# Patient Record
Sex: MALE | Race: WHITE | NOT HISPANIC OR LATINO | Employment: OTHER | URBAN - METROPOLITAN AREA
[De-identification: names, ages, dates, MRNs, and addresses within clinical notes are randomized per-mention and may not be internally consistent; named-entity substitution may affect disease eponyms.]

---

## 2023-02-25 LAB
ALANINE AMINOTRANSFERASE (SGPT) (U/L) IN SER/PLAS: 22 U/L (ref 10–52)
ALBUMIN (G/DL) IN SER/PLAS: 4.4 G/DL (ref 3.4–5)
ALKALINE PHOSPHATASE (U/L) IN SER/PLAS: 56 U/L (ref 33–120)
ANION GAP IN SER/PLAS: 16 MMOL/L (ref 10–20)
ASPARTATE AMINOTRANSFERASE (SGOT) (U/L) IN SER/PLAS: 23 U/L (ref 9–39)
BILIRUBIN TOTAL (MG/DL) IN SER/PLAS: 0.5 MG/DL (ref 0–1.2)
CALCIUM (MG/DL) IN SER/PLAS: 9.3 MG/DL (ref 8.6–10.6)
CARBON DIOXIDE, TOTAL (MMOL/L) IN SER/PLAS: 21 MMOL/L (ref 21–32)
CHLORIDE (MMOL/L) IN SER/PLAS: 106 MMOL/L (ref 98–107)
CREATININE (MG/DL) IN SER/PLAS: 0.84 MG/DL (ref 0.5–1.3)
ESTIMATED AVERAGE GLUCOSE FOR HBA1C: 157 MG/DL
GFR MALE: >90 ML/MIN/1.73M2
GLUCOSE (MG/DL) IN SER/PLAS: 193 MG/DL (ref 74–99)
HEMOGLOBIN A1C/HEMOGLOBIN TOTAL IN BLOOD: 7.1 %
POTASSIUM (MMOL/L) IN SER/PLAS: 4.7 MMOL/L (ref 3.5–5.3)
PROTEIN TOTAL: 6.4 G/DL (ref 6.4–8.2)
SODIUM (MMOL/L) IN SER/PLAS: 138 MMOL/L (ref 136–145)
UREA NITROGEN (MG/DL) IN SER/PLAS: 19 MG/DL (ref 6–23)

## 2023-03-15 PROBLEM — G47.34 NOCTURNAL HYPOXIA: Status: ACTIVE | Noted: 2023-03-15

## 2023-03-15 PROBLEM — I10 HTN (HYPERTENSION), BENIGN: Status: ACTIVE | Noted: 2023-03-15

## 2023-03-15 PROBLEM — I42.0 CARDIOMYOPATHY, DILATED (MULTI): Status: ACTIVE | Noted: 2023-03-15

## 2023-03-15 PROBLEM — M54.9 CHRONIC BACK PAIN: Status: ACTIVE | Noted: 2023-03-15

## 2023-03-15 PROBLEM — G47.33 OSA (OBSTRUCTIVE SLEEP APNEA): Status: ACTIVE | Noted: 2023-03-15

## 2023-03-15 PROBLEM — N40.0 ENLARGED PROSTATE: Status: ACTIVE | Noted: 2023-03-15

## 2023-03-15 PROBLEM — I26.99 BILATERAL PULMONARY EMBOLISM (MULTI): Status: ACTIVE | Noted: 2023-03-15

## 2023-03-15 PROBLEM — R93.1 REGIONAL WALL MOTION ABNORMALITY OF HEART: Status: ACTIVE | Noted: 2023-03-15

## 2023-03-15 PROBLEM — K21.9 CHRONIC GERD: Status: ACTIVE | Noted: 2023-03-15

## 2023-03-15 PROBLEM — M54.50 LOWER BACK PAIN: Status: ACTIVE | Noted: 2023-03-15

## 2023-03-15 PROBLEM — D75.829 HEPARIN-INDUCED THROMBOCYTOPENIA (MULTI): Status: ACTIVE | Noted: 2023-03-15

## 2023-03-15 PROBLEM — R29.898 LEG WEAKNESS: Status: ACTIVE | Noted: 2023-03-15

## 2023-03-15 PROBLEM — M54.16 LUMBAR RADICULAR PAIN: Status: ACTIVE | Noted: 2023-03-15

## 2023-03-15 PROBLEM — Z86.59 HISTORY OF DEPRESSION: Status: ACTIVE | Noted: 2023-03-15

## 2023-03-15 PROBLEM — E83.42 HYPOMAGNESEMIA: Status: ACTIVE | Noted: 2023-03-15

## 2023-03-15 PROBLEM — R79.89 LOW TESTOSTERONE: Status: ACTIVE | Noted: 2023-03-15

## 2023-03-15 PROBLEM — E11.40 DIABETIC NEUROPATHY (MULTI): Status: ACTIVE | Noted: 2023-03-15

## 2023-03-15 PROBLEM — M54.16 CHRONIC LUMBAR RADICULOPATHY: Status: ACTIVE | Noted: 2023-03-15

## 2023-03-15 PROBLEM — G89.29 CHRONIC BACK PAIN: Status: ACTIVE | Noted: 2023-03-15

## 2023-03-15 PROBLEM — M54.30 SCIATICA NEURALGIA: Status: ACTIVE | Noted: 2023-03-15

## 2023-03-15 PROBLEM — I82.409 DVT OF LOWER EXTREMITY (DEEP VENOUS THROMBOSIS) (MULTI): Status: ACTIVE | Noted: 2023-03-15

## 2023-03-15 PROBLEM — M25.552 LEFT HIP PAIN: Status: ACTIVE | Noted: 2023-03-15

## 2023-03-15 PROBLEM — N40.0 BPH (BENIGN PROSTATIC HYPERPLASIA): Status: ACTIVE | Noted: 2023-03-15

## 2023-03-15 PROBLEM — M51.9 LUMBAR DISC DISEASE: Status: ACTIVE | Noted: 2023-03-15

## 2023-03-15 PROBLEM — R30.0 DYSURIA: Status: ACTIVE | Noted: 2023-03-15

## 2023-03-15 PROBLEM — E66.9 OBESITY: Status: ACTIVE | Noted: 2023-03-15

## 2023-03-15 PROBLEM — M51.26 LUMBAR DISC HERNIATION: Status: ACTIVE | Noted: 2023-03-15

## 2023-03-15 PROBLEM — M47.816 LUMBAR SPONDYLOSIS: Status: ACTIVE | Noted: 2023-03-15

## 2023-03-15 PROBLEM — Z86.718 HISTORY OF VENOUS THROMBOEMBOLISM: Status: ACTIVE | Noted: 2023-03-15

## 2023-03-15 PROBLEM — E55.9 VITAMIN D DEFICIENCY: Status: ACTIVE | Noted: 2023-03-15

## 2023-03-15 PROBLEM — E78.00 HYPERCHOLESTEREMIA: Status: ACTIVE | Noted: 2023-03-15

## 2023-03-15 PROBLEM — M48.061 SPINAL STENOSIS, LUMBAR: Status: ACTIVE | Noted: 2023-03-15

## 2023-03-15 PROBLEM — G62.9 NEUROPATHY: Status: ACTIVE | Noted: 2023-03-15

## 2023-03-15 PROBLEM — R26.81 UNSTEADY GAIT: Status: ACTIVE | Noted: 2023-03-15

## 2023-03-15 PROBLEM — E78.1 HYPERTRIGLYCERIDEMIA: Status: ACTIVE | Noted: 2023-03-15

## 2023-03-15 PROBLEM — E11.9 DM TYPE 2 (DIABETES MELLITUS, TYPE 2) (MULTI): Status: ACTIVE | Noted: 2023-03-15

## 2023-03-15 RX ORDER — GABAPENTIN 300 MG/1
3 CAPSULE ORAL 2 TIMES DAILY
COMMUNITY
Start: 2019-07-18 | End: 2023-03-17 | Stop reason: SDUPTHER

## 2023-03-15 RX ORDER — INSULIN GLARGINE-YFGN 100 [IU]/ML
64 INJECTION, SOLUTION SUBCUTANEOUS DAILY
COMMUNITY
Start: 2021-10-04 | End: 2023-10-05 | Stop reason: SDUPTHER

## 2023-03-15 RX ORDER — PANTOPRAZOLE SODIUM 40 MG/1
1 TABLET, DELAYED RELEASE ORAL DAILY
COMMUNITY
Start: 2022-08-18 | End: 2023-03-17 | Stop reason: SDUPTHER

## 2023-03-15 RX ORDER — ROSUVASTATIN CALCIUM 20 MG/1
1 TABLET, COATED ORAL DAILY
COMMUNITY
Start: 2021-05-03 | End: 2023-03-17 | Stop reason: SDUPTHER

## 2023-03-15 RX ORDER — VENLAFAXINE HYDROCHLORIDE 150 MG/1
150 CAPSULE, EXTENDED RELEASE ORAL DAILY
COMMUNITY
Start: 2019-07-18 | End: 2023-03-17 | Stop reason: SDUPTHER

## 2023-03-15 RX ORDER — NAPROXEN 500 MG/1
500 TABLET ORAL 2 TIMES DAILY PRN
COMMUNITY
Start: 2021-05-06

## 2023-03-15 RX ORDER — TAMSULOSIN HYDROCHLORIDE 0.4 MG/1
0.8 CAPSULE ORAL DAILY
COMMUNITY
Start: 2019-07-18 | End: 2023-03-17 | Stop reason: SDUPTHER

## 2023-03-15 RX ORDER — CARVEDILOL 25 MG/1
2 TABLET ORAL 2 TIMES DAILY
COMMUNITY
Start: 2020-07-29 | End: 2024-01-22

## 2023-03-15 RX ORDER — METFORMIN HYDROCHLORIDE 1000 MG/1
1 TABLET ORAL 2 TIMES DAILY
COMMUNITY
Start: 2022-08-18 | End: 2024-02-05

## 2023-03-15 RX ORDER — INSULIN GLARGINE 100 [IU]/ML
44 INJECTION, SOLUTION SUBCUTANEOUS 2 TIMES DAILY
COMMUNITY
End: 2023-11-09 | Stop reason: SDUPTHER

## 2023-03-15 RX ORDER — SEMAGLUTIDE 2.68 MG/ML
2 INJECTION, SOLUTION SUBCUTANEOUS
COMMUNITY
Start: 2021-09-09 | End: 2023-10-05 | Stop reason: SDUPTHER

## 2023-03-15 RX ORDER — ACETAMINOPHEN 325 MG/1
1-2 TABLET ORAL EVERY 4 HOURS PRN
COMMUNITY

## 2023-03-15 RX ORDER — LISINOPRIL 20 MG/1
1 TABLET ORAL DAILY
COMMUNITY
Start: 2020-05-26 | End: 2024-01-25 | Stop reason: DRUGHIGH

## 2023-03-17 ENCOUNTER — OFFICE VISIT (OUTPATIENT)
Dept: PRIMARY CARE | Facility: CLINIC | Age: 53
End: 2023-03-17
Payer: COMMERCIAL

## 2023-03-17 VITALS
HEIGHT: 77 IN | HEART RATE: 98 BPM | WEIGHT: 315 LBS | TEMPERATURE: 95.4 F | OXYGEN SATURATION: 99 % | RESPIRATION RATE: 18 BRPM | DIASTOLIC BLOOD PRESSURE: 76 MMHG | BODY MASS INDEX: 37.19 KG/M2 | SYSTOLIC BLOOD PRESSURE: 114 MMHG

## 2023-03-17 DIAGNOSIS — M54.16 LUMBAR RADICULAR PAIN: ICD-10-CM

## 2023-03-17 DIAGNOSIS — F41.9 ANXIETY AND DEPRESSION: ICD-10-CM

## 2023-03-17 DIAGNOSIS — K21.9 CHRONIC GERD: ICD-10-CM

## 2023-03-17 DIAGNOSIS — N40.1 BENIGN PROSTATIC HYPERPLASIA WITH LOWER URINARY TRACT SYMPTOMS, SYMPTOM DETAILS UNSPECIFIED: ICD-10-CM

## 2023-03-17 DIAGNOSIS — F32.A ANXIETY AND DEPRESSION: ICD-10-CM

## 2023-03-17 DIAGNOSIS — E78.5 HYPERLIPIDEMIA, UNSPECIFIED HYPERLIPIDEMIA TYPE: ICD-10-CM

## 2023-03-17 DIAGNOSIS — M79.602 ARM PAIN, ANTERIOR, LEFT: Primary | ICD-10-CM

## 2023-03-17 PROCEDURE — 4010F ACE/ARB THERAPY RXD/TAKEN: CPT | Performed by: NURSE PRACTITIONER

## 2023-03-17 PROCEDURE — 1036F TOBACCO NON-USER: CPT | Performed by: NURSE PRACTITIONER

## 2023-03-17 PROCEDURE — 99214 OFFICE O/P EST MOD 30 MIN: CPT | Performed by: NURSE PRACTITIONER

## 2023-03-17 PROCEDURE — 3078F DIAST BP <80 MM HG: CPT | Performed by: NURSE PRACTITIONER

## 2023-03-17 PROCEDURE — 3074F SYST BP LT 130 MM HG: CPT | Performed by: NURSE PRACTITIONER

## 2023-03-17 PROCEDURE — 3051F HG A1C>EQUAL 7.0%<8.0%: CPT | Performed by: NURSE PRACTITIONER

## 2023-03-17 RX ORDER — FLASH GLUCOSE SCANNING READER
EACH MISCELLANEOUS
COMMUNITY
Start: 2022-06-09 | End: 2023-09-21 | Stop reason: ALTCHOICE

## 2023-03-17 RX ORDER — METHYLPREDNISOLONE 4 MG/1
TABLET ORAL
Qty: 21 TABLET | Refills: 0 | Status: SHIPPED | OUTPATIENT
Start: 2023-03-17 | End: 2023-03-17 | Stop reason: ENTERED-IN-ERROR

## 2023-03-17 RX ORDER — ROSUVASTATIN CALCIUM 20 MG/1
20 TABLET, COATED ORAL DAILY
Qty: 90 TABLET | Refills: 1 | Status: SHIPPED | OUTPATIENT
Start: 2023-03-17 | End: 2023-09-21 | Stop reason: SDUPTHER

## 2023-03-17 RX ORDER — TAMSULOSIN HYDROCHLORIDE 0.4 MG/1
0.8 CAPSULE ORAL DAILY
Qty: 90 CAPSULE | Refills: 1 | Status: SHIPPED | OUTPATIENT
Start: 2023-03-17 | End: 2023-09-21 | Stop reason: SDUPTHER

## 2023-03-17 RX ORDER — GABAPENTIN 300 MG/1
900 CAPSULE ORAL 2 TIMES DAILY
Qty: 90 CAPSULE | Refills: 1 | Status: SHIPPED | OUTPATIENT
Start: 2023-03-17 | End: 2023-05-01

## 2023-03-17 RX ORDER — VENLAFAXINE HYDROCHLORIDE 150 MG/1
150 CAPSULE, EXTENDED RELEASE ORAL DAILY
Qty: 90 CAPSULE | Refills: 1 | Status: SHIPPED | OUTPATIENT
Start: 2023-03-17 | End: 2023-09-21 | Stop reason: SDUPTHER

## 2023-03-17 RX ORDER — METHYLPREDNISOLONE 4 MG/1
TABLET ORAL
Qty: 21 TABLET | Refills: 0 | Status: SHIPPED | OUTPATIENT
Start: 2023-03-17 | End: 2023-03-24

## 2023-03-17 RX ORDER — FLASH GLUCOSE SENSOR
KIT MISCELLANEOUS
COMMUNITY
Start: 2022-08-31

## 2023-03-17 RX ORDER — PANTOPRAZOLE SODIUM 40 MG/1
40 TABLET, DELAYED RELEASE ORAL DAILY
Qty: 90 TABLET | Refills: 1 | Status: SHIPPED | OUTPATIENT
Start: 2023-03-17 | End: 2023-09-21 | Stop reason: SDUPTHER

## 2023-03-17 ASSESSMENT — ENCOUNTER SYMPTOMS
JOINT SWELLING: 0
NECK PAIN: 0
NUMBNESS: 1

## 2023-03-17 NOTE — PROGRESS NOTES
"Subjective   Patient ID: Hua Saavedra is a 52 y.o. male who presents for Extremity Weakness and Arm Pain.    HPI     Patient presents to clinic for follow-up visit and medication refills.    Patient states he been having sensation of numbness and weakness in his left arm for 2 weeks.  He states he has full range of motion of his arm.  He denies any trauma or injury to the arm.  He states he had similar symptoms a few years ago took steroids and it relieved the symptoms.    Other than his arm numbness he states generally he has been feeling okay.    Patient being followed by cardiology managing cardiomyopathy and hypertension.     Patient also being followed by endocrinology managing his diabetes mellitus last hemoglobin A1c  7.1 Feb 2023.     Appetite Normal Bowel and Bladder normal     Review of Systems   Cardiovascular:  Negative for chest pain.   Musculoskeletal:  Negative for joint swelling and neck pain.   Neurological:  Positive for numbness (Right arm).       Objective   /76   Pulse 98   Temp 35.2 °C (95.4 °F) (Temporal)   Resp 18   Ht 1.956 m (6' 5\")   Wt (!) 151 kg (333 lb)   SpO2 99%   BMI 39.49 kg/m²     Physical Exam  Vitals reviewed.   Constitutional:       Appearance: Normal appearance.   Cardiovascular:      Rate and Rhythm: Normal rate and regular rhythm.   Pulmonary:      Effort: Pulmonary effort is normal.      Breath sounds: Normal breath sounds.   Musculoskeletal:         General: No tenderness. Normal range of motion.      Cervical back: Normal range of motion and neck supple.   Skin:     General: Skin is warm and dry.   Neurological:      Mental Status: He is alert and oriented to person, place, and time.         Assessment/Plan   Problem List Items Addressed This Visit          Nervous    Lumbar radicular pain    Relevant Medications    gabapentin (Neurontin) 300 mg capsule    pantoprazole (ProtoNix) 40 mg EC tablet       Digestive    Chronic GERD    Relevant Medications    " pantoprazole (ProtoNix) 40 mg EC tablet       Genitourinary    BPH (benign prostatic hyperplasia)    Relevant Medications    tamsulosin (Flomax) 0.4 mg 24 hr capsule     Other Visit Diagnoses       Arm pain, anterior, left    -  Primary    Relevant Medications    methylPREDNISolone (Medrol Dospak) 4 mg tablets    Hyperlipidemia, unspecified hyperlipidemia type        Relevant Medications    rosuvastatin (Crestor) 20 mg tablet    Anxiety and depression        Relevant Medications    venlafaxine XR (Effexor-XR) 150 mg 24 hr capsule

## 2023-03-17 NOTE — PATIENT INSTRUCTIONS
Continue medications as prescribed.  Healthy diet and drink plenty of water.  Please schedule all necessary health screenings ophthalmology and dentist.    Call office with update.  Follow-up in 6 months.  Call office any new concerns.

## 2023-04-13 ENCOUNTER — TELEPHONE (OUTPATIENT)
Dept: PRIMARY CARE | Facility: CLINIC | Age: 53
End: 2023-04-13

## 2023-04-13 NOTE — TELEPHONE ENCOUNTER
Patient left a voicemail stating that on day 3 and 4 of prednisone he was back to normal then it wore off, so what does he do next? Neurologist? Ortho?

## 2023-04-14 DIAGNOSIS — R20.0 ARM NUMBNESS LEFT: Primary | ICD-10-CM

## 2023-04-28 DIAGNOSIS — M54.16 LUMBAR RADICULAR PAIN: ICD-10-CM

## 2023-05-01 RX ORDER — GABAPENTIN 300 MG/1
CAPSULE ORAL
Qty: 180 CAPSULE | Refills: 2 | Status: SHIPPED | OUTPATIENT
Start: 2023-05-01 | End: 2023-07-11

## 2023-07-10 DIAGNOSIS — M54.16 LUMBAR RADICULAR PAIN: ICD-10-CM

## 2023-07-11 RX ORDER — GABAPENTIN 300 MG/1
CAPSULE ORAL
Qty: 180 CAPSULE | Refills: 6 | Status: SHIPPED | OUTPATIENT
Start: 2023-07-11 | End: 2024-02-06

## 2023-09-21 ENCOUNTER — OFFICE VISIT (OUTPATIENT)
Dept: PRIMARY CARE | Facility: CLINIC | Age: 53
End: 2023-09-21
Payer: COMMERCIAL

## 2023-09-21 VITALS
DIASTOLIC BLOOD PRESSURE: 81 MMHG | HEART RATE: 96 BPM | BODY MASS INDEX: 38.43 KG/M2 | WEIGHT: 315 LBS | SYSTOLIC BLOOD PRESSURE: 118 MMHG

## 2023-09-21 DIAGNOSIS — M54.16 LUMBAR RADICULAR PAIN: Primary | ICD-10-CM

## 2023-09-21 DIAGNOSIS — E78.5 HYPERLIPIDEMIA, UNSPECIFIED HYPERLIPIDEMIA TYPE: ICD-10-CM

## 2023-09-21 DIAGNOSIS — K21.9 CHRONIC GERD: ICD-10-CM

## 2023-09-21 DIAGNOSIS — N40.1 BENIGN PROSTATIC HYPERPLASIA WITH LOWER URINARY TRACT SYMPTOMS, SYMPTOM DETAILS UNSPECIFIED: ICD-10-CM

## 2023-09-21 DIAGNOSIS — F41.9 ANXIETY AND DEPRESSION: ICD-10-CM

## 2023-09-21 DIAGNOSIS — F32.A ANXIETY AND DEPRESSION: ICD-10-CM

## 2023-09-21 PROCEDURE — 3074F SYST BP LT 130 MM HG: CPT | Performed by: NURSE PRACTITIONER

## 2023-09-21 PROCEDURE — 3051F HG A1C>EQUAL 7.0%<8.0%: CPT | Performed by: NURSE PRACTITIONER

## 2023-09-21 PROCEDURE — 3079F DIAST BP 80-89 MM HG: CPT | Performed by: NURSE PRACTITIONER

## 2023-09-21 PROCEDURE — 90471 IMMUNIZATION ADMIN: CPT | Performed by: NURSE PRACTITIONER

## 2023-09-21 PROCEDURE — 90682 RIV4 VACC RECOMBINANT DNA IM: CPT | Performed by: NURSE PRACTITIONER

## 2023-09-21 PROCEDURE — 1036F TOBACCO NON-USER: CPT | Performed by: NURSE PRACTITIONER

## 2023-09-21 PROCEDURE — 4010F ACE/ARB THERAPY RXD/TAKEN: CPT | Performed by: NURSE PRACTITIONER

## 2023-09-21 PROCEDURE — 99214 OFFICE O/P EST MOD 30 MIN: CPT | Performed by: NURSE PRACTITIONER

## 2023-09-21 RX ORDER — PANTOPRAZOLE SODIUM 40 MG/1
40 TABLET, DELAYED RELEASE ORAL DAILY
Qty: 90 TABLET | Refills: 2 | Status: SHIPPED | OUTPATIENT
Start: 2023-09-21

## 2023-09-21 RX ORDER — TAMSULOSIN HYDROCHLORIDE 0.4 MG/1
0.8 CAPSULE ORAL DAILY
Qty: 90 CAPSULE | Refills: 2 | Status: SHIPPED | OUTPATIENT
Start: 2023-09-21 | End: 2024-01-22

## 2023-09-21 RX ORDER — VENLAFAXINE HYDROCHLORIDE 150 MG/1
150 CAPSULE, EXTENDED RELEASE ORAL DAILY
Qty: 90 CAPSULE | Refills: 2 | Status: SHIPPED | OUTPATIENT
Start: 2023-09-21 | End: 2024-05-09 | Stop reason: SDUPTHER

## 2023-09-21 RX ORDER — ROSUVASTATIN CALCIUM 20 MG/1
20 TABLET, COATED ORAL DAILY
Qty: 90 TABLET | Refills: 2 | Status: SHIPPED | OUTPATIENT
Start: 2023-09-21

## 2023-09-21 NOTE — PROGRESS NOTES
Subjective   Patient ID: Hua Saavedra is a 53 y.o. male who presents for Follow-up.    HPI   Patient presents to clinic for follow-up visit need medication refills.    Being followed by neurology for cervical radiculitis with PT and OT states symptoms has much improved.  He states he is also receiving home physical therapy.    Patient also being followed by endocrinology managing his diabetes.    Appetite normal bowel and bladder normal.      Review of Systems   All other systems reviewed and are negative.      Objective   /81   Pulse 69   Wt 147 kg (324 lb 1.2 oz)   BMI 38.43 kg/m²     Physical Exam  Vitals reviewed.   Constitutional:       Appearance: Normal appearance. He is not ill-appearing or toxic-appearing.   Cardiovascular:      Rate and Rhythm: Normal rate and regular rhythm.   Pulmonary:      Effort: Pulmonary effort is normal.      Breath sounds: Normal breath sounds.   Musculoskeletal:         General: Normal range of motion.   Skin:     General: Skin is warm and dry.   Neurological:      Mental Status: He is alert and oriented to person, place, and time.         Assessment/Plan   Problem List Items Addressed This Visit       BPH (benign prostatic hyperplasia)    Relevant Medications    tamsulosin (Flomax) 0.4 mg 24 hr capsule    Other Relevant Orders    Prostate Spec.Ag,Screen    Chronic GERD    Relevant Medications    pantoprazole (ProtoNix) 40 mg EC tablet    Lumbar radicular pain - Primary    Relevant Medications    pantoprazole (ProtoNix) 40 mg EC tablet     Other Visit Diagnoses       Anxiety and depression        Relevant Medications    venlafaxine XR (Effexor-XR) 150 mg 24 hr capsule    Other Relevant Orders    Tsh With Reflex To Free T4 If Abnormal    Hyperlipidemia, unspecified hyperlipidemia type        Relevant Medications    rosuvastatin (Crestor) 20 mg tablet

## 2023-09-21 NOTE — PATIENT INSTRUCTIONS
Continue medications as prescribed.  Healthy diet and drink plenty of water.  Please schedule all necessary health screenings ophthalmology and dentist.    Follow-up in 6 MONTHS.  Call office any new concerns.

## 2023-09-28 ASSESSMENT — ENCOUNTER SYMPTOMS
LEG PAIN: 1
WEAKNESS: 1
NUMBNESS: 1
BACK PAIN: 1

## 2023-09-29 ENCOUNTER — OFFICE VISIT (OUTPATIENT)
Dept: PRIMARY CARE | Facility: CLINIC | Age: 53
End: 2023-09-29
Payer: COMMERCIAL

## 2023-09-29 VITALS
WEIGHT: 315 LBS | TEMPERATURE: 98 F | OXYGEN SATURATION: 96 % | BODY MASS INDEX: 38.57 KG/M2 | SYSTOLIC BLOOD PRESSURE: 130 MMHG | DIASTOLIC BLOOD PRESSURE: 76 MMHG | RESPIRATION RATE: 18 BRPM | HEART RATE: 81 BPM

## 2023-09-29 DIAGNOSIS — M54.41 ACUTE RIGHT-SIDED LOW BACK PAIN WITH RIGHT-SIDED SCIATICA: Primary | ICD-10-CM

## 2023-09-29 PROCEDURE — 4010F ACE/ARB THERAPY RXD/TAKEN: CPT | Performed by: NURSE PRACTITIONER

## 2023-09-29 PROCEDURE — 3075F SYST BP GE 130 - 139MM HG: CPT | Performed by: NURSE PRACTITIONER

## 2023-09-29 PROCEDURE — 3051F HG A1C>EQUAL 7.0%<8.0%: CPT | Performed by: NURSE PRACTITIONER

## 2023-09-29 PROCEDURE — 3078F DIAST BP <80 MM HG: CPT | Performed by: NURSE PRACTITIONER

## 2023-09-29 PROCEDURE — 99214 OFFICE O/P EST MOD 30 MIN: CPT | Performed by: NURSE PRACTITIONER

## 2023-09-29 PROCEDURE — 1036F TOBACCO NON-USER: CPT | Performed by: NURSE PRACTITIONER

## 2023-09-29 RX ORDER — METHOCARBAMOL 500 MG/1
TABLET, FILM COATED ORAL
Qty: 20 TABLET | Refills: 0 | Status: SHIPPED | OUTPATIENT
Start: 2023-09-29 | End: 2023-10-05 | Stop reason: ALTCHOICE

## 2023-09-29 RX ORDER — METHYLPREDNISOLONE 4 MG/1
TABLET ORAL
Qty: 21 TABLET | Refills: 0 | Status: SHIPPED | OUTPATIENT
Start: 2023-09-29 | End: 2023-10-05 | Stop reason: ALTCHOICE

## 2023-09-29 ASSESSMENT — ENCOUNTER SYMPTOMS
DYSURIA: 0
NUMBNESS: 1
LEG PAIN: 1
WEAKNESS: 1
BACK PAIN: 1
FLANK PAIN: 0

## 2023-09-29 ASSESSMENT — PATIENT HEALTH QUESTIONNAIRE - PHQ9
SUM OF ALL RESPONSES TO PHQ9 QUESTIONS 1 AND 2: 0
2. FEELING DOWN, DEPRESSED OR HOPELESS: NOT AT ALL
1. LITTLE INTEREST OR PLEASURE IN DOING THINGS: NOT AT ALL

## 2023-09-29 ASSESSMENT — PAIN SCALES - GENERAL: PAINLEVEL: 8

## 2023-09-29 NOTE — PATIENT INSTRUCTIONS
You have been diagnosed with low back pain with sciatica.  Please take steroids as prescribed.  The steroids will temporarily increase your blood sugars please check regularly.  Please take muscle relaxer as needed for pain at night.  Continue ice or heat packs as needed.  Continue lidocaine patch as needed.  Follow-up as needed.  We will refer to medical spine clinic if needed.

## 2023-09-29 NOTE — PROGRESS NOTES
Answers submitted by the patient for this visit:  Back Pain Questionnaire (Submitted on 9/28/2023)  Chief Complaint: Back pain  Chronicity: new  Onset: in the past 7 days  Frequency: constantly  Progression since onset: unchanged  Pain location: gluteal  Pain quality: shooting  Radiates to: right thigh  Pain - numeric: 8/10  Pain is: the same all the time  Aggravated by: position  Stiffness is present: in the morning, at night, all day  leg pain: Yes  numbness: Yes  weakness: Yes  Risk factors: lack of exercise, obesity

## 2023-09-29 NOTE — PROGRESS NOTES
Subjective   Patient ID: Hua Saavedra is a 53 y.o. male who presents for Lower back pain (Right leg/thigh/buttock pain. Reports ongoing since Sunday morning. ).    Back Pain  This is a new problem. The current episode started in the past 7 days. The problem occurs constantly. The problem is unchanged. The pain is present in the gluteal. The quality of the pain is described as shooting. The pain radiates to the right thigh. The pain is at a severity of 8/10. The pain is The same all the time. The symptoms are aggravated by position. Stiffness is present In the morning, at night and all day. Associated symptoms include leg pain, numbness and weakness. Pertinent negatives include no dysuria. Risk factors include lack of exercise and obesity.        Patient complains of pain x5 days in the lower back that radiates down right buttock and leg.  He denies any direct trauma or injury but states he did a lot of walking going to a baseball game and did not have his walker.  She denies urinary symptoms.    Patient with history of chronic back problems history of laminectomy.  Patient states he has been using over-the-counter medication lidocaine patches and Advil.  He also states he has been using ice and heat without relief of pain.       Review of Systems   Genitourinary:  Negative for dysuria and flank pain.   Musculoskeletal:  Positive for back pain and gait problem.   Neurological:  Positive for weakness and numbness.       Objective   /76 (BP Location: Left arm, Patient Position: Sitting, BP Cuff Size: Large adult)   Pulse 81   Temp 36.7 °C (98 °F) (Temporal)   Resp 18   Wt 148 kg (325 lb 4.8 oz)   SpO2 96%   BMI 38.57 kg/m²     Physical Exam  Vitals reviewed.   Constitutional:       Appearance: Normal appearance.   Cardiovascular:      Rate and Rhythm: Normal rate and regular rhythm.   Pulmonary:      Effort: Pulmonary effort is normal.      Breath sounds: Normal breath sounds.   Musculoskeletal:          General: Normal range of motion.      Comments: Mild point tenderness near right sciatic nerve negative tenderness spinal or paraspinal       Skin:     General: Skin is warm and dry.   Neurological:      Mental Status: He is alert and oriented to person, place, and time.         Assessment/Plan   Problem List Items Addressed This Visit       Lower back pain - Primary    Relevant Medications    methylPREDNISolone (Medrol Dospak) 4 mg tablets    methocarbamol (Robaxin) 500 mg tablet  Lidocaine patches as needed  Ice or heat packs as needed.  Medical spine referral if needed.  Physical therapy referral if needed.

## 2023-09-30 ENCOUNTER — LAB (OUTPATIENT)
Dept: LAB | Facility: LAB | Age: 53
End: 2023-09-30
Payer: COMMERCIAL

## 2023-09-30 DIAGNOSIS — E11.9 TYPE 2 DIABETES MELLITUS WITHOUT COMPLICATIONS (MULTI): Primary | ICD-10-CM

## 2023-09-30 DIAGNOSIS — F32.A ANXIETY AND DEPRESSION: ICD-10-CM

## 2023-09-30 DIAGNOSIS — F41.9 ANXIETY AND DEPRESSION: ICD-10-CM

## 2023-09-30 DIAGNOSIS — N40.1 BENIGN PROSTATIC HYPERPLASIA WITH LOWER URINARY TRACT SYMPTOMS, SYMPTOM DETAILS UNSPECIFIED: ICD-10-CM

## 2023-09-30 LAB
ALBUMIN SERPL BCP-MCNC: 4.2 G/DL (ref 3.4–5)
ALP SERPL-CCNC: 45 U/L (ref 33–120)
ALT SERPL W P-5'-P-CCNC: 19 U/L (ref 10–52)
ANION GAP SERPL CALC-SCNC: 16 MMOL/L (ref 10–20)
AST SERPL W P-5'-P-CCNC: 18 U/L (ref 9–39)
BILIRUB SERPL-MCNC: 0.5 MG/DL (ref 0–1.2)
BUN SERPL-MCNC: 20 MG/DL (ref 6–23)
CALCIUM SERPL-MCNC: 9.2 MG/DL (ref 8.6–10.6)
CHLORIDE SERPL-SCNC: 102 MMOL/L (ref 98–107)
CHOLEST SERPL-MCNC: 136 MG/DL (ref 0–199)
CHOLESTEROL/HDL RATIO: 2.9
CO2 SERPL-SCNC: 24 MMOL/L (ref 21–32)
CREAT SERPL-MCNC: 0.79 MG/DL (ref 0.5–1.3)
CREAT UR-MCNC: 135.4 MG/DL (ref 20–370)
ERYTHROCYTE [DISTWIDTH] IN BLOOD BY AUTOMATED COUNT: 12.2 % (ref 11.5–14.5)
GFR SERPL CREATININE-BSD FRML MDRD: >90 ML/MIN/1.73M*2
GLUCOSE SERPL-MCNC: 126 MG/DL (ref 74–99)
HCT VFR BLD AUTO: 41.3 % (ref 41–52)
HDLC SERPL-MCNC: 47.6 MG/DL
HGB BLD-MCNC: 13.8 G/DL (ref 13.5–17.5)
LDLC SERPL CALC-MCNC: 60 MG/DL (ref 140–190)
MCH RBC QN AUTO: 31.2 PG (ref 26–34)
MCHC RBC AUTO-ENTMCNC: 33.4 G/DL (ref 32–36)
MCV RBC AUTO: 93 FL (ref 80–100)
MICROALBUMIN UR-MCNC: <7 MG/L
MICROALBUMIN/CREAT UR: NORMAL MG/G{CREAT}
NON HDL CHOLESTEROL: 88 MG/DL (ref 0–149)
NRBC BLD-RTO: 0 /100 WBCS (ref 0–0)
PLATELET # BLD AUTO: 163 X10*3/UL (ref 150–450)
PMV BLD AUTO: 11.1 FL (ref 7.5–11.5)
POTASSIUM SERPL-SCNC: 4.7 MMOL/L (ref 3.5–5.3)
PROT SERPL-MCNC: 6.4 G/DL (ref 6.4–8.2)
PSA SERPL-MCNC: 0.59 NG/ML
RBC # BLD AUTO: 4.42 X10*6/UL (ref 4.5–5.9)
SODIUM SERPL-SCNC: 137 MMOL/L (ref 136–145)
TRIGL SERPL-MCNC: 144 MG/DL (ref 0–149)
TSH SERPL-ACNC: 0.95 MIU/L (ref 0.44–3.98)
VLDL: 29 MG/DL (ref 0–40)
WBC # BLD AUTO: 8.9 X10*3/UL (ref 4.4–11.3)

## 2023-09-30 PROCEDURE — 36415 COLL VENOUS BLD VENIPUNCTURE: CPT

## 2023-10-01 LAB
EST. AVERAGE GLUCOSE BLD GHB EST-MCNC: 143 MG/DL
HBA1C MFR BLD: 6.6 %

## 2023-10-05 ENCOUNTER — OFFICE VISIT (OUTPATIENT)
Dept: ENDOCRINOLOGY | Facility: CLINIC | Age: 53
End: 2023-10-05
Payer: COMMERCIAL

## 2023-10-05 VITALS
SYSTOLIC BLOOD PRESSURE: 120 MMHG | HEART RATE: 82 BPM | WEIGHT: 315 LBS | HEIGHT: 77 IN | DIASTOLIC BLOOD PRESSURE: 76 MMHG | BODY MASS INDEX: 37.19 KG/M2

## 2023-10-05 DIAGNOSIS — I10 HTN (HYPERTENSION), BENIGN: ICD-10-CM

## 2023-10-05 DIAGNOSIS — E11.9 TYPE 2 DIABETES MELLITUS WITHOUT COMPLICATION, WITH LONG-TERM CURRENT USE OF INSULIN (MULTI): Primary | ICD-10-CM

## 2023-10-05 DIAGNOSIS — Z79.4 TYPE 2 DIABETES MELLITUS WITHOUT COMPLICATION, WITH LONG-TERM CURRENT USE OF INSULIN (MULTI): Primary | ICD-10-CM

## 2023-10-05 DIAGNOSIS — E78.00 HYPERCHOLESTEREMIA: ICD-10-CM

## 2023-10-05 PROCEDURE — 1036F TOBACCO NON-USER: CPT | Performed by: INTERNAL MEDICINE

## 2023-10-05 PROCEDURE — 3078F DIAST BP <80 MM HG: CPT | Performed by: INTERNAL MEDICINE

## 2023-10-05 PROCEDURE — 3062F POS MACROALBUMINURIA REV: CPT | Performed by: INTERNAL MEDICINE

## 2023-10-05 PROCEDURE — 99214 OFFICE O/P EST MOD 30 MIN: CPT | Performed by: INTERNAL MEDICINE

## 2023-10-05 PROCEDURE — 3044F HG A1C LEVEL LT 7.0%: CPT | Performed by: INTERNAL MEDICINE

## 2023-10-05 PROCEDURE — 4010F ACE/ARB THERAPY RXD/TAKEN: CPT | Performed by: INTERNAL MEDICINE

## 2023-10-05 PROCEDURE — 3048F LDL-C <100 MG/DL: CPT | Performed by: INTERNAL MEDICINE

## 2023-10-05 PROCEDURE — 3074F SYST BP LT 130 MM HG: CPT | Performed by: INTERNAL MEDICINE

## 2023-10-05 RX ORDER — SEMAGLUTIDE 2.68 MG/ML
2 INJECTION, SOLUTION SUBCUTANEOUS
Qty: 9 ML | Refills: 3 | Status: SHIPPED | OUTPATIENT
Start: 2023-10-05 | End: 2024-02-25

## 2023-10-05 ASSESSMENT — ENCOUNTER SYMPTOMS
VOMITING: 0
FEVER: 0
DIARRHEA: 0
SHORTNESS OF BREATH: 0
CHILLS: 0
NAUSEA: 0
LIGHT-HEADEDNESS: 0
DIZZINESS: 0

## 2023-10-05 NOTE — PROGRESS NOTES
"Subjective   Patient ID: Hua Saavedra is a 53 y.o. male who presents for Diabetes, Hypertension, and Hyperlipidemia.  HPI  Since last visit really working on eating.   Down 10 lbs.   More active until recent sciatica issue.   Sugars much improved.  Last time split long acting insulin which is going well.  No lows.       Review of Systems   Constitutional:  Negative for chills and fever.   Respiratory:  Negative for shortness of breath.    Gastrointestinal:  Negative for diarrhea, nausea and vomiting.   Endocrine: Negative for cold intolerance and heat intolerance.   Neurological:  Negative for dizziness and light-headedness.       Objective    10/05/23 08:22   Heart Rate 82   /76   Weight 145 kg (320 lb)   Height 1.956 m (6' 5\")   BMI (Calculated) 37.94     Physical Exam  Constitutional:       Appearance: Normal appearance. He is overweight.   HENT:      Head: Normocephalic and atraumatic.   Neck:      Thyroid: No thyroid mass, thyromegaly or thyroid tenderness.   Cardiovascular:      Rate and Rhythm: Normal rate and regular rhythm.      Heart sounds: No murmur heard.     No gallop.   Pulmonary:      Effort: Pulmonary effort is normal.      Breath sounds: Normal breath sounds.   Abdominal:      Palpations: Abdomen is soft.      Comments: benign   Neurological:      General: No focal deficit present.      Mental Status: He is alert and oriented to person, place, and time.      Deep Tendon Reflexes: Reflexes are normal and symmetric.   Psychiatric:         Behavior: Behavior is cooperative.         Assessment/Plan   Problem List Items Addressed This Visit             ICD-10-CM    DM type 2 (diabetes mellitus, type 2) (CMS/[facility]) - Primary E11.9    HTN (hypertension), benign I10    Hypercholesteremia E78.00   Dm2:  A1c excellent, continue current meds  Htn  BP excellent, continue current meds  Hyperlipidemia:  Controlled, continue statin  Follow up in 4 months       "

## 2023-10-18 ENCOUNTER — TELEPHONE (OUTPATIENT)
Dept: PRIMARY CARE | Facility: CLINIC | Age: 53
End: 2023-10-18
Payer: COMMERCIAL

## 2023-10-18 DIAGNOSIS — G89.29 CHRONIC RIGHT-SIDED LOW BACK PAIN WITH RIGHT-SIDED SCIATICA: Primary | ICD-10-CM

## 2023-10-18 DIAGNOSIS — M54.41 CHRONIC RIGHT-SIDED LOW BACK PAIN WITH RIGHT-SIDED SCIATICA: Primary | ICD-10-CM

## 2023-10-18 NOTE — TELEPHONE ENCOUNTER
PATIENT CALLED AND SAID THAT HIS LOWER BACK IS NOT GETTING BETTER AND WANTED TO KNOW IF HE NEEDED TO SEE HIS SURGEON AGAIN OR IF YOU COULD PUT THAT REFERRAL IN FOR MEDICAL SPINE CLINIC THAT YOU GUYS TALKED ABOUT DURING LAST VISIT

## 2023-11-08 ENCOUNTER — OFFICE VISIT (OUTPATIENT)
Dept: ORTHOPEDIC SURGERY | Facility: CLINIC | Age: 53
End: 2023-11-08
Payer: COMMERCIAL

## 2023-11-08 DIAGNOSIS — M54.41 CHRONIC RIGHT-SIDED LOW BACK PAIN WITH RIGHT-SIDED SCIATICA: ICD-10-CM

## 2023-11-08 DIAGNOSIS — G89.29 CHRONIC RIGHT-SIDED LOW BACK PAIN WITH RIGHT-SIDED SCIATICA: ICD-10-CM

## 2023-11-08 PROCEDURE — 3074F SYST BP LT 130 MM HG: CPT | Performed by: ORTHOPAEDIC SURGERY

## 2023-11-08 PROCEDURE — 99213 OFFICE O/P EST LOW 20 MIN: CPT | Performed by: ORTHOPAEDIC SURGERY

## 2023-11-08 PROCEDURE — 3062F POS MACROALBUMINURIA REV: CPT | Performed by: ORTHOPAEDIC SURGERY

## 2023-11-08 PROCEDURE — 3048F LDL-C <100 MG/DL: CPT | Performed by: ORTHOPAEDIC SURGERY

## 2023-11-08 PROCEDURE — 3044F HG A1C LEVEL LT 7.0%: CPT | Performed by: ORTHOPAEDIC SURGERY

## 2023-11-08 PROCEDURE — 4010F ACE/ARB THERAPY RXD/TAKEN: CPT | Performed by: ORTHOPAEDIC SURGERY

## 2023-11-08 PROCEDURE — 3078F DIAST BP <80 MM HG: CPT | Performed by: ORTHOPAEDIC SURGERY

## 2023-11-08 PROCEDURE — 1036F TOBACCO NON-USER: CPT | Performed by: ORTHOPAEDIC SURGERY

## 2023-11-08 NOTE — PROGRESS NOTES
Patient returns for follow-up.  He is 3 years status post lumbar laminectomy and excision of synovial cyst.    He did well following surgery.  He now presents with progressively worsening right leg radicular pain and claudication.  He has gone through multiple sessions of physical therapy over the last several months.  He is taking medications without relief.    He does not any focal deficits on exam.    He does not have any imaging for review today.    I recommended we check a new MRI of his lumbar spine.  He will follow-up with me after the MRI is complete.    *This note was dictated using speech recognition software and was not corrected for spelling or grammatical errors*

## 2023-11-09 DIAGNOSIS — Z79.4 TYPE 2 DIABETES MELLITUS WITHOUT COMPLICATION, WITH LONG-TERM CURRENT USE OF INSULIN (MULTI): Primary | ICD-10-CM

## 2023-11-09 DIAGNOSIS — E11.9 TYPE 2 DIABETES MELLITUS WITHOUT COMPLICATION, WITH LONG-TERM CURRENT USE OF INSULIN (MULTI): Primary | ICD-10-CM

## 2023-11-09 RX ORDER — INSULIN GLARGINE 100 [IU]/ML
44 INJECTION, SOLUTION SUBCUTANEOUS 2 TIMES DAILY
Qty: 79.2 ML | Refills: 3 | Status: SHIPPED | OUTPATIENT
Start: 2023-11-09 | End: 2024-11-08

## 2023-11-22 ENCOUNTER — ANCILLARY PROCEDURE (OUTPATIENT)
Dept: RADIOLOGY | Facility: CLINIC | Age: 53
End: 2023-11-22
Payer: COMMERCIAL

## 2023-11-22 DIAGNOSIS — G89.29 CHRONIC RIGHT-SIDED LOW BACK PAIN WITH RIGHT-SIDED SCIATICA: ICD-10-CM

## 2023-11-22 DIAGNOSIS — M54.41 CHRONIC RIGHT-SIDED LOW BACK PAIN WITH RIGHT-SIDED SCIATICA: ICD-10-CM

## 2023-11-22 PROCEDURE — 72148 MRI LUMBAR SPINE W/O DYE: CPT | Performed by: STUDENT IN AN ORGANIZED HEALTH CARE EDUCATION/TRAINING PROGRAM

## 2023-11-22 PROCEDURE — 72148 MRI LUMBAR SPINE W/O DYE: CPT

## 2023-12-13 ENCOUNTER — OFFICE VISIT (OUTPATIENT)
Dept: ORTHOPEDIC SURGERY | Facility: CLINIC | Age: 53
End: 2023-12-13
Payer: COMMERCIAL

## 2023-12-13 VITALS — HEIGHT: 77 IN | BODY MASS INDEX: 37.19 KG/M2 | WEIGHT: 315 LBS

## 2023-12-13 DIAGNOSIS — M48.062 NEUROGENIC CLAUDICATION DUE TO LUMBAR SPINAL STENOSIS: ICD-10-CM

## 2023-12-13 DIAGNOSIS — M54.16 LUMBAR RADICULOPATHY: Primary | ICD-10-CM

## 2023-12-13 PROCEDURE — 99213 OFFICE O/P EST LOW 20 MIN: CPT | Performed by: ORTHOPAEDIC SURGERY

## 2023-12-13 PROCEDURE — 3048F LDL-C <100 MG/DL: CPT | Performed by: ORTHOPAEDIC SURGERY

## 2023-12-13 PROCEDURE — 3044F HG A1C LEVEL LT 7.0%: CPT | Performed by: ORTHOPAEDIC SURGERY

## 2023-12-13 PROCEDURE — 1036F TOBACCO NON-USER: CPT | Performed by: ORTHOPAEDIC SURGERY

## 2023-12-13 PROCEDURE — 4010F ACE/ARB THERAPY RXD/TAKEN: CPT | Performed by: ORTHOPAEDIC SURGERY

## 2023-12-13 PROCEDURE — 3062F POS MACROALBUMINURIA REV: CPT | Performed by: ORTHOPAEDIC SURGERY

## 2023-12-13 ASSESSMENT — PAIN - FUNCTIONAL ASSESSMENT: PAIN_FUNCTIONAL_ASSESSMENT: NO/DENIES PAIN

## 2023-12-13 NOTE — PROGRESS NOTES
Patient returns for follow-up to review his MRI.  The pain that he was having at his last visit has subsided substantially, no greater than 4/10.  He thinks that it does not require any surgical treatment currently.    I personally reviewed the MRI.  This shows improved central stenosis at L2-3 with grade 1 spondylolisthesis now present.  At L3-4 there is severe central and lateral recess stenosis.  Severe lateral recess and moderate central stenosis at L4-5 with grade 1 spondylolisthesis.  There is underlying congenital lumbar stenosis as well.    At this point he would like to continue with conservative measures.  He asked for a new prescription for physical therapy which was provided.  I also recommended referral to pain management for trial of injections.    If he does not have relief of his symptoms and would like to proceed with surgery he would be a reasonable candidate, he should follow-up with me to discuss that at greater length if it comes to it.    *This note was dictated using speech recognition software and was not corrected for spelling or grammatical errors*

## 2023-12-27 ENCOUNTER — OFFICE VISIT (OUTPATIENT)
Dept: PAIN MEDICINE | Facility: CLINIC | Age: 53
End: 2023-12-27
Payer: COMMERCIAL

## 2023-12-27 VITALS
DIASTOLIC BLOOD PRESSURE: 83 MMHG | RESPIRATION RATE: 18 BRPM | SYSTOLIC BLOOD PRESSURE: 172 MMHG | BODY MASS INDEX: 36.84 KG/M2 | HEART RATE: 95 BPM | HEIGHT: 77 IN | TEMPERATURE: 96.4 F | WEIGHT: 312 LBS

## 2023-12-27 DIAGNOSIS — M48.062 NEUROGENIC CLAUDICATION DUE TO LUMBAR SPINAL STENOSIS: ICD-10-CM

## 2023-12-27 DIAGNOSIS — M54.16 LUMBAR RADICULOPATHY: ICD-10-CM

## 2023-12-27 PROCEDURE — 3079F DIAST BP 80-89 MM HG: CPT | Performed by: ANESTHESIOLOGY

## 2023-12-27 PROCEDURE — 99204 OFFICE O/P NEW MOD 45 MIN: CPT | Performed by: ANESTHESIOLOGY

## 2023-12-27 PROCEDURE — 3044F HG A1C LEVEL LT 7.0%: CPT | Performed by: ANESTHESIOLOGY

## 2023-12-27 PROCEDURE — 3048F LDL-C <100 MG/DL: CPT | Performed by: ANESTHESIOLOGY

## 2023-12-27 PROCEDURE — 4010F ACE/ARB THERAPY RXD/TAKEN: CPT | Performed by: ANESTHESIOLOGY

## 2023-12-27 PROCEDURE — 99214 OFFICE O/P EST MOD 30 MIN: CPT | Performed by: ANESTHESIOLOGY

## 2023-12-27 PROCEDURE — 3062F POS MACROALBUMINURIA REV: CPT | Performed by: ANESTHESIOLOGY

## 2023-12-27 PROCEDURE — 3077F SYST BP >= 140 MM HG: CPT | Performed by: ANESTHESIOLOGY

## 2023-12-27 PROCEDURE — 1036F TOBACCO NON-USER: CPT | Performed by: ANESTHESIOLOGY

## 2023-12-27 ASSESSMENT — PAIN DESCRIPTION - DESCRIPTORS: DESCRIPTORS: THROBBING;SHOOTING

## 2023-12-27 ASSESSMENT — PAIN SCALES - GENERAL
PAINLEVEL: 6
PAINLEVEL_OUTOF10: 6

## 2023-12-27 ASSESSMENT — ENCOUNTER SYMPTOMS
BACK PAIN: 1
ADENOPATHY: 0
DEPRESSION: 0
FEVER: 0
CHEST TIGHTNESS: 0
ABDOMINAL PAIN: 0
EYE PAIN: 0
WEAKNESS: 0
LOSS OF SENSATION IN FEET: 1
OCCASIONAL FEELINGS OF UNSTEADINESS: 1
SHORTNESS OF BREATH: 0

## 2023-12-27 ASSESSMENT — COLUMBIA-SUICIDE SEVERITY RATING SCALE - C-SSRS
2. HAVE YOU ACTUALLY HAD ANY THOUGHTS OF KILLING YOURSELF?: NO
1. IN THE PAST MONTH, HAVE YOU WISHED YOU WERE DEAD OR WISHED YOU COULD GO TO SLEEP AND NOT WAKE UP?: NO
6. HAVE YOU EVER DONE ANYTHING, STARTED TO DO ANYTHING, OR PREPARED TO DO ANYTHING TO END YOUR LIFE?: NO

## 2023-12-27 ASSESSMENT — PAIN - FUNCTIONAL ASSESSMENT: PAIN_FUNCTIONAL_ASSESSMENT: 0-10

## 2023-12-27 ASSESSMENT — LIFESTYLE VARIABLES: TOTAL SCORE: 4

## 2023-12-27 NOTE — H&P (VIEW-ONLY)
Chief Complain    New Patient Visit (For pain in low back that goes down left leg that has been going on for 7 weeks. Had a laminectomy 1/2022. Has hip and knee pain. Deny any surgery to that side. Had MRI done through . Was referred by Dr Quiroga. Take naprosyn, help some.)    History Of Present Illness  Hua Saavedra is a 53 y.o. male here for evaluation of low back pain, radiating to left lower extremity. The patient has been experiencing these symptoms for last 2 month(s). The patient describes the pain as throbbing, shooting. The patient's current pain score is 6 on a scale from 0-10. The pain is worsened by bending forward and walking and is alleviated by  resting . Since the start of the symptoms the pain has been worse.    The patient denies any fever, chills, weight loss, weakness, bladder/ bowel incontinence, history of cancer, history of IV drug abuse, recent trauma.      Past Medical History  He has a past medical history of Anxiety, Arthritis (2013), Benign prostatic hyperplasia with lower urinary tract symptoms, symptom details unspecified, Cardiomyopathy, dilated (CMS/HCC), Depression, Diabetes mellitus (CMS/HCC), GERD (gastroesophageal reflux disease), HIT (heparin-induced thrombocytopenia) (CMS/HCC), Hypercholesteremia, Hyperlipidemia, unspecified hyperlipidemia type, Hypertension, Hypertriglyceridemia, Lumbar radicular pain, Obesity, and RADHA (obstructive sleep apnea).    Surgical History  He has a past surgical history that includes ASD repair; Biceps Tenodesis; Embolectomy (04/16/2019); Cardiac surgery (1988); and Spine surgery (2019).    Social History  He reports that he has never smoked. He has never used smokeless tobacco. He reports current alcohol use of about 3.0 standard drinks of alcohol per week. He reports that he does not use drugs.    Family History  Family History   Problem Relation Name Age of Onset    Hypertension Mother Nelda     Alcohol abuse Mother Nelda     Valvular heart  disease Father Henri     Kidney disease Father Henri     No Known Problems Sister      No Known Problems Brother      No Known Problems Daughter      No Known Problems Son      No Known Problems Mother's Sister      No Known Problems Mother's Brother      No Known Problems Father's Sister      No Known Problems Father's Brother      No Known Problems Maternal Grandmother      No Known Problems Maternal Grandfather      No Known Problems Paternal Grandmother      No Known Problems Paternal Grandfather      No Known Problems Other          Allergies  Heparin    Review of Systems  Review of Systems   Constitutional:  Negative for fever.   HENT:  Negative for ear pain.    Eyes:  Negative for pain.   Respiratory:  Negative for chest tightness and shortness of breath.    Cardiovascular:  Negative for chest pain.   Gastrointestinal:  Negative for abdominal pain.   Endocrine: Negative for cold intolerance and heat intolerance.   Musculoskeletal:  Positive for back pain.   Skin:  Negative for rash.   Allergic/Immunologic: Negative for food allergies.   Neurological:  Negative for weakness.   Hematological:  Negative for adenopathy.   Psychiatric/Behavioral:  Negative for suicidal ideas.         Physical Exam  Physical Exam  HENT:      Head: Normocephalic and atraumatic.      Right Ear: External ear normal.      Left Ear: External ear normal.      Nose: Nose normal.      Mouth/Throat:      Mouth: Mucous membranes are moist.   Eyes:      Extraocular Movements: Extraocular movements intact.      Pupils: Pupils are equal, round, and reactive to light.   Cardiovascular:      Rate and Rhythm: Normal rate.   Pulmonary:      Effort: Pulmonary effort is normal.   Abdominal:      Palpations: Abdomen is soft.   Musculoskeletal:      Cervical back: Neck supple.      Lumbar back: No swelling, deformity or signs of trauma. Decreased range of motion.   Skin:     General: Skin is warm.   Neurological:      Mental Status: He is alert and  "oriented to person, place, and time.      Motor: Weakness present.      Gait: Gait abnormal.      Deep Tendon Reflexes:      Reflex Scores:       Patellar reflexes are 1+ on the right side and 1+ on the left side.       Achilles reflexes are 0 on the right side and 0 on the left side.     Comments: 4 /5 strength in bilateral foot dorsiflexion   Psychiatric:         Mood and Affect: Mood normal.         Behavior: Behavior normal.           Last Recorded Vitals  Blood pressure 172/83, pulse 95, temperature 35.8 °C (96.4 °F), resp. rate 18, height 1.956 m (6' 5\"), weight 142 kg (312 lb).    Reviewed Images  Reviewed and independently interpreted MRI Lumbar spine previous laminectomy at L2-3, significant spinal canal stenosis at L3-4    Reviewed Labs        Latest Reference Range & Units 09/30/23 08:39   WBC 4.4 - 11.3 x10*3/uL 8.9   nRBC 0.0 - 0.0 /100 WBCs 0.0   RBC 4.50 - 5.90 x10*6/uL 4.42 (L)   HEMOGLOBIN 13.5 - 17.5 g/dL 13.8   HEMATOCRIT 41.0 - 52.0 % 41.3   MCV 80 - 100 fL 93   MCH 26.0 - 34.0 pg 31.2   MCHC 32.0 - 36.0 g/dL 33.4   RED CELL DISTRIBUTION WIDTH 11.5 - 14.5 % 12.2   Platelets 150 - 450 x10*3/uL 163   MEAN PLATELET VOLUME 7.5 - 11.5 fL 11.1   (L): Data is abnormally low  Assessment/Plan     Hua Saavedra is a 53 y.o. male here for evaluation of low back pain radiating to left hip, left knee.  He has been experiencing the current symptoms for last couple of months or so.  Does have history of chronic low back issues for last few years, he is s/p lumbar laminectomy done last year at L2-3 which did relieve his pain for several months.  Was again seen by Dr. Quiroga who did his previous surgery who ordered an MRI which did reveal L3-4 spinal canal stenosis and other degenerative changes along with congenitally narrow spinal canal, these finding are likely responsible for her symptoms.  He has been sent to physical therapy Dr. Quiroga, I would recommend trial of L3-4 epidural steroid injection as next " step in managing his pain.  Continue with gabapentin as previously prescribed.  Continue with physical therapy.       Sajan Garcia MD

## 2024-01-05 ENCOUNTER — ANCILLARY PROCEDURE (OUTPATIENT)
Dept: RADIOLOGY | Facility: CLINIC | Age: 54
End: 2024-01-05
Payer: COMMERCIAL

## 2024-01-05 ENCOUNTER — HOSPITAL ENCOUNTER (OUTPATIENT)
Dept: PAIN MEDICINE | Facility: CLINIC | Age: 54
Discharge: HOME | End: 2024-01-05
Payer: COMMERCIAL

## 2024-01-05 VITALS
OXYGEN SATURATION: 95 % | TEMPERATURE: 97.5 F | HEIGHT: 77 IN | SYSTOLIC BLOOD PRESSURE: 120 MMHG | BODY MASS INDEX: 36.6 KG/M2 | WEIGHT: 310 LBS | RESPIRATION RATE: 18 BRPM | DIASTOLIC BLOOD PRESSURE: 66 MMHG | HEART RATE: 102 BPM

## 2024-01-05 DIAGNOSIS — M54.16 LUMBAR RADICULOPATHY: ICD-10-CM

## 2024-01-05 DIAGNOSIS — M48.062 NEUROGENIC CLAUDICATION DUE TO LUMBAR SPINAL STENOSIS: ICD-10-CM

## 2024-01-05 PROCEDURE — 62323 NJX INTERLAMINAR LMBR/SAC: CPT | Performed by: ANESTHESIOLOGY

## 2024-01-05 PROCEDURE — 77003 FLUOROGUIDE FOR SPINE INJECT: CPT

## 2024-01-05 PROCEDURE — 2500000004 HC RX 250 GENERAL PHARMACY W/ HCPCS (ALT 636 FOR OP/ED)

## 2024-01-05 PROCEDURE — A4216 STERILE WATER/SALINE, 10 ML: HCPCS

## 2024-01-05 PROCEDURE — 2500000005 HC RX 250 GENERAL PHARMACY W/O HCPCS

## 2024-01-05 RX ORDER — METHYLPREDNISOLONE ACETATE 40 MG/ML
INJECTION, SUSPENSION INTRA-ARTICULAR; INTRALESIONAL; INTRAMUSCULAR; SOFT TISSUE
Status: COMPLETED
Start: 2024-01-05 | End: 2024-01-05

## 2024-01-05 RX ORDER — SODIUM CHLORIDE 9 MG/ML
INJECTION, SOLUTION INTRAMUSCULAR; INTRAVENOUS; SUBCUTANEOUS
Status: COMPLETED
Start: 2024-01-05 | End: 2024-01-05

## 2024-01-05 RX ORDER — LIDOCAINE HYDROCHLORIDE 10 MG/ML
INJECTION, SOLUTION EPIDURAL; INFILTRATION; INTRACAUDAL; PERINEURAL
Status: COMPLETED
Start: 2024-01-05 | End: 2024-01-05

## 2024-01-05 RX ORDER — ROPIVACAINE HYDROCHLORIDE 5 MG/ML
INJECTION, SOLUTION EPIDURAL; INFILTRATION; PERINEURAL
Status: COMPLETED
Start: 2024-01-05 | End: 2024-01-05

## 2024-01-05 RX ADMIN — ROPIVACAINE HYDROCHLORIDE 100 MG: 5 INJECTION, SOLUTION EPIDURAL; INFILTRATION; PERINEURAL at 09:52

## 2024-01-05 RX ADMIN — SODIUM CHLORIDE 10 ML: 9 INJECTION, SOLUTION INTRAMUSCULAR; INTRAVENOUS; SUBCUTANEOUS at 09:52

## 2024-01-05 RX ADMIN — METHYLPREDNISOLONE ACETATE 40 MG: 40 INJECTION, SUSPENSION INTRA-ARTICULAR; INTRALESIONAL; INTRAMUSCULAR; INTRASYNOVIAL; SOFT TISSUE at 09:52

## 2024-01-05 RX ADMIN — LIDOCAINE HYDROCHLORIDE 50 MG: 10 INJECTION, SOLUTION EPIDURAL; INFILTRATION; INTRACAUDAL; PERINEURAL at 09:52

## 2024-01-05 ASSESSMENT — PAIN DESCRIPTION - DESCRIPTORS: DESCRIPTORS: ACHING

## 2024-01-05 ASSESSMENT — COLUMBIA-SUICIDE SEVERITY RATING SCALE - C-SSRS
6. HAVE YOU EVER DONE ANYTHING, STARTED TO DO ANYTHING, OR PREPARED TO DO ANYTHING TO END YOUR LIFE?: NO
1. IN THE PAST MONTH, HAVE YOU WISHED YOU WERE DEAD OR WISHED YOU COULD GO TO SLEEP AND NOT WAKE UP?: NO
2. HAVE YOU ACTUALLY HAD ANY THOUGHTS OF KILLING YOURSELF?: NO

## 2024-01-05 ASSESSMENT — PAIN SCALES - GENERAL
PAINLEVEL_OUTOF10: 6
PAINLEVEL_OUTOF10: 6

## 2024-01-05 ASSESSMENT — ACTIVITIES OF DAILY LIVING (ADL): EFFECT OF PAIN ON DAILY ACTIVITIES: WALKING

## 2024-01-05 ASSESSMENT — PAIN - FUNCTIONAL ASSESSMENT: PAIN_FUNCTIONAL_ASSESSMENT: 0-10

## 2024-01-05 NOTE — OP NOTE
Procedure Note     Date: 2024  OR Location: [facility] NON-OR PROCEDURES    Name: Hua Saavedra, : 1970, Age: 53 y.o., MRN: [id number], Sex: male    Diagnosis  Preprocedure diagnosis: spinal stenosis  Postprocedure diagnosis: Same    Procedures  Lumbar Epidural Steroid Injection    The patient was seen in the preoperative area. The risks, benefits, complications, treatment options, non-operative alternatives, expected recovery and outcomes were discussed with the patient. The patient concurred with the proposed plan, giving informed consent.      Procedure Details:   Lumbar Epidural Steroid Injection Procedure Note      Procedure: The risks and benefits of treatment options and alternatives were discussed with the patient, and consent was obtained for a Lumbar epidural steroid injection. He wishes to proceed. He was placed in a prone position. Area overlying the L4-5space was cleaned with ChloraPrep solution and draped using standard sterile precautions. Skin was anesthetized with 1% lidocaine. A 3.5 inch 20 G Touhy needle was advanced with AP, lateral, oblique fluoroscopy to the L4-5epidural space using loss-of-resistance technique. No paresthesias were induced. 2 ml of Omnipaque was injected demonstrating spread of the dye  in the epidural space. No intravascular spread was noticed. After negative aspiration for blood and CSF, a solution containing Depomedrol 40mg, 1 mL of 0.5% ropivacaine and 3 ml of normal saline was injected without inducing paresthesia or pain. Patient was transferred to recovery in stable condition and subsequently discharged home.        Complications:  None; patient tolerated the procedure well.    Disposition: Home  Condition: stable         Additional Details: NA    Attending Attestation: I performed the procedure.    Sajan Garcia MD

## 2024-01-05 NOTE — Clinical Note
Prepped with ChloraPrep, a minimum of 3 minute dry time, longer if needed, no pooling noted, patient draped in sterile fashion. NANCY

## 2024-01-19 PROBLEM — G56.02 CARPAL TUNNEL SYNDROME OF LEFT WRIST: Status: ACTIVE | Noted: 2024-01-19

## 2024-01-19 PROBLEM — R20.2 PARESTHESIA OF LEFT UPPER EXTREMITY: Status: ACTIVE | Noted: 2024-01-19

## 2024-01-19 PROBLEM — E78.5 HYPERLIPIDEMIA: Chronic | Status: ACTIVE | Noted: 2023-03-15

## 2024-01-19 PROBLEM — I26.99 BILATERAL PULMONARY EMBOLISM (MULTI): Chronic | Status: ACTIVE | Noted: 2023-03-15

## 2024-01-19 PROBLEM — E66.812 CLASS 2 OBESITY WITH BODY MASS INDEX (BMI) OF 36.0 TO 36.9 IN ADULT: Status: ACTIVE | Noted: 2023-03-15

## 2024-01-19 PROBLEM — E78.5 HYPERLIPIDEMIA: Status: ACTIVE | Noted: 2023-03-15

## 2024-01-19 PROBLEM — I82.409 DVT OF LOWER EXTREMITY (DEEP VENOUS THROMBOSIS) (MULTI): Status: RESOLVED | Noted: 2023-03-15 | Resolved: 2024-01-19

## 2024-01-19 PROBLEM — Z87.74 HISTORY OF REPAIR OF ATRIAL SEPTAL DEFECT: Chronic | Status: ACTIVE | Noted: 2024-01-19

## 2024-01-19 PROBLEM — G47.33 OSA (OBSTRUCTIVE SLEEP APNEA): Chronic | Status: ACTIVE | Noted: 2023-03-15

## 2024-01-19 PROBLEM — I42.0 CARDIOMYOPATHY, DILATED (MULTI): Chronic | Status: ACTIVE | Noted: 2023-03-15

## 2024-01-19 PROBLEM — F41.8 MIXED ANXIETY DEPRESSIVE DISORDER: Status: ACTIVE | Noted: 2023-09-30

## 2024-01-19 PROBLEM — D75.829 HEPARIN-INDUCED THROMBOCYTOPENIA (MULTI): Chronic | Status: ACTIVE | Noted: 2023-03-15

## 2024-01-19 PROBLEM — E55.9 VITAMIN D DEFICIENCY: Status: RESOLVED | Noted: 2023-03-15 | Resolved: 2024-01-19

## 2024-01-19 PROBLEM — Z86.59 HISTORY OF DEPRESSION: Status: RESOLVED | Noted: 2023-03-15 | Resolved: 2024-01-19

## 2024-01-19 PROBLEM — R00.0 TACHYCARDIA: Status: ACTIVE | Noted: 2024-01-19

## 2024-01-21 DIAGNOSIS — N40.1 BENIGN PROSTATIC HYPERPLASIA WITH LOWER URINARY TRACT SYMPTOMS, SYMPTOM DETAILS UNSPECIFIED: ICD-10-CM

## 2024-01-21 DIAGNOSIS — I10 HTN (HYPERTENSION), BENIGN: ICD-10-CM

## 2024-01-22 RX ORDER — CARVEDILOL 25 MG/1
25 TABLET ORAL 2 TIMES DAILY
Qty: 180 TABLET | Refills: 3 | Status: SHIPPED | OUTPATIENT
Start: 2024-01-22 | End: 2024-01-25 | Stop reason: DRUGHIGH

## 2024-01-22 RX ORDER — TAMSULOSIN HYDROCHLORIDE 0.4 MG/1
0.8 CAPSULE ORAL DAILY
Qty: 90 CAPSULE | Refills: 2 | Status: SHIPPED | OUTPATIENT
Start: 2024-01-22 | End: 2024-05-20

## 2024-01-24 PROBLEM — I10 HTN (HYPERTENSION), BENIGN: Chronic | Status: ACTIVE | Noted: 2023-03-15

## 2024-01-24 PROBLEM — R00.0 TACHYCARDIA: Chronic | Status: ACTIVE | Noted: 2024-01-19

## 2024-01-25 ENCOUNTER — OFFICE VISIT (OUTPATIENT)
Dept: CARDIOLOGY | Facility: CLINIC | Age: 54
End: 2024-01-25
Payer: COMMERCIAL

## 2024-01-25 VITALS
HEART RATE: 104 BPM | SYSTOLIC BLOOD PRESSURE: 136 MMHG | WEIGHT: 315 LBS | DIASTOLIC BLOOD PRESSURE: 84 MMHG | BODY MASS INDEX: 39.01 KG/M2 | OXYGEN SATURATION: 96 %

## 2024-01-25 DIAGNOSIS — I42.0 CARDIOMYOPATHY, DILATED (MULTI): Chronic | ICD-10-CM

## 2024-01-25 DIAGNOSIS — R00.0 TACHYCARDIA: Primary | Chronic | ICD-10-CM

## 2024-01-25 DIAGNOSIS — I10 HTN (HYPERTENSION), BENIGN: Chronic | ICD-10-CM

## 2024-01-25 DIAGNOSIS — D75.829 HEPARIN-INDUCED THROMBOCYTOPENIA (MULTI): Chronic | ICD-10-CM

## 2024-01-25 PROCEDURE — 3075F SYST BP GE 130 - 139MM HG: CPT | Performed by: INTERNAL MEDICINE

## 2024-01-25 PROCEDURE — 99214 OFFICE O/P EST MOD 30 MIN: CPT | Performed by: INTERNAL MEDICINE

## 2024-01-25 PROCEDURE — 3079F DIAST BP 80-89 MM HG: CPT | Performed by: INTERNAL MEDICINE

## 2024-01-25 PROCEDURE — 4010F ACE/ARB THERAPY RXD/TAKEN: CPT | Performed by: INTERNAL MEDICINE

## 2024-01-25 PROCEDURE — 1036F TOBACCO NON-USER: CPT | Performed by: INTERNAL MEDICINE

## 2024-01-25 PROCEDURE — 93000 ELECTROCARDIOGRAM COMPLETE: CPT | Performed by: INTERNAL MEDICINE

## 2024-01-25 RX ORDER — LISINOPRIL 40 MG/1
40 TABLET ORAL DAILY
Qty: 90 TABLET | Refills: 3 | Status: SHIPPED | OUTPATIENT
Start: 2024-01-25 | End: 2025-01-24

## 2024-01-25 RX ORDER — CARVEDILOL 25 MG/1
50 TABLET ORAL 2 TIMES DAILY
Qty: 360 TABLET | Refills: 3 | Status: SHIPPED | OUTPATIENT
Start: 2024-01-25 | End: 2025-01-24

## 2024-01-25 RX ORDER — LISINOPRIL 40 MG/1
40 TABLET ORAL DAILY
Qty: 90 TABLET | Refills: 3 | Status: SHIPPED | OUTPATIENT
Start: 2024-01-25 | End: 2024-01-25 | Stop reason: SDUPTHER

## 2024-01-25 RX ORDER — CARVEDILOL 25 MG/1
50 TABLET ORAL 2 TIMES DAILY
Qty: 360 TABLET | Refills: 3 | Status: SHIPPED | OUTPATIENT
Start: 2024-01-25 | End: 2024-01-25 | Stop reason: SDUPTHER

## 2024-01-25 NOTE — PATIENT INSTRUCTIONS
1. Cardiomyopathy.  Unclear etiology.  Ejection fraction ranges between 40 to 54%.  On echocardiography it usually in the 40 to 45% range.  Continue GDMT up titration.  He is on carvedilol 50 twice daily.  I will increase the dose of his lisinopril to 40 mg.  He does not appear to be volume overloaded.  He is not currently on any diuretics.  SGLT2 inhibitors are not an option because of previous history of DKA.  He is on semaglutide.  No orthopnea no lower extremity edema and no significant shortness of breath at the lower activities engaging in.    2. Hypertension still somewhat elevated.  I will increase his lisinopril to 40 mg prescription a lot of this is related to just having pain in his back.      3. Atrial septal defect. He had a repair in 1988. On the repeat echo, there is no evidence of leak across the ASD repair.     4. Bilateral pulmonary emboli 2019 status post catheter-based thrombectomy. This is thought to be possibly heparin-induced thrombocytopenia and/or related antibiotics. He is currently off anticoagulation. He is followed by vascular medicine. On the echo, his RV is mildly hypokinetic and slightly enlarged. This may also be related to obesity and sleep apnea.     5.  Hyperlipidemia.  On rosuvastatin.  9/30/2023 LDL 60, HDL 48 triglycerides 144.  Hemoglobin A1c 6.6 these numbers are excellent     RTC 6 months

## 2024-01-25 NOTE — PROGRESS NOTES
hReferred by No ref. provider found    HPI I am seeing Hua for a little over a year follow-up.  Unfortunately he is not suffering with his back.  Even sitting in the office he is grimacing in pain.  As a result activity has been significantly curtailed.  Weight is up approximately 15 to 20 pounds.  He had no cardiac symptoms whatsoever.  He is taking carvedilol 50 twice daily.  No significant volume overload.    Past Medical History:  Problem List Items Addressed This Visit    None       Past Medical History:   Diagnosis Date    Anxiety     Arthritis 2013    Benign prostatic hyperplasia with lower urinary tract symptoms, symptom details unspecified     Bilateral pulmonary embolism (CMS/HCC) 03/15/2023    [place] [facility] [place] to [place] H[place]. Now off coumadin. Followed with Dr. Matthews    Cardiomyopathy, dilated (CMS/HCC)     Depression     Diabetes mellitus (CMS/HCC)     Type 2    GERD (gastroesophageal reflux disease)     Heparin-induced thrombocytopenia (CMS/HCC) 03/15/2023    4/2019 likely due to ABX. Complicated with DVT/PE    History of repair of atrial septal defect 01/19/2024    Congenital ASD repair 4/1988    HIT (heparin-induced thrombocytopenia) (CMS/HCC)     Hypercholesteremia     Hyperlipidemia 03/15/2023    PCP follows    Hyperlipidemia, unspecified hyperlipidemia type     Hypertension     Hypertriglyceridemia     Lumbar radicular pain     Obesity     RADHA (obstructive sleep apnea)              Past Surgical History:  He has a past surgical history that includes ASD repair; Biceps Tenodesis; Embolectomy (04/16/2019); Cardiac surgery (1988); and Spine surgery (2019).      Social History:  He reports that he has never smoked. He has never used smokeless tobacco. He reports current alcohol use of about 3.0 standard drinks of alcohol per week. He reports that he does not use drugs.    Family History:  Family History   Problem Relation Name Age of Onset    Hypertension Mother Nelda     Alcohol abuse  Mother Nelda     Valvular heart disease Father Henri     Kidney disease Father Henri     No Known Problems Sister      No Known Problems Brother      No Known Problems Daughter      No Known Problems Son      No Known Problems Mother's Sister      No Known Problems Mother's Brother      No Known Problems Father's Sister      No Known Problems Father's Brother      No Known Problems Maternal Grandmother      No Known Problems Maternal Grandfather      No Known Problems Paternal Grandmother      No Known Problems Paternal Grandfather      No Known Problems Other          Allergies:  Heparin    Outpatient Medications:  Current Outpatient Medications   Medication Instructions    acetaminophen (Tylenol) 325 mg tablet 1-2 tablets, oral, Every 4 hours PRN    carvedilol (COREG) 25 mg, oral, 2 times daily    FreeStyle Leisa 2 Sensor kit USE AS DIRECTED AND CHANGE EVERY 14 DAYS.    gabapentin (Neurontin) 300 mg capsule TAKE 3 CAPSULES IN THE MORNING AND 3 CAPSULES BEFORE BEDTIME    insulin glargine (LANTUS) 44 Units, subcutaneous, 2 times daily, Take as directed per insulin instructions.    lisinopril 20 mg tablet 1 tablet, oral, Daily    metFORMIN (Glucophage) 1,000 mg tablet 1 tablet, oral, 2 times daily    naproxen (NAPROSYN) 500 mg, oral, 2 times daily PRN    Ozempic 2 mg, subcutaneous, Weekly    pantoprazole (PROTONIX) 40 mg, oral, Daily    rosuvastatin (CRESTOR) 20 mg, oral, Daily    tamsulosin (FLOMAX) 0.8 mg, oral, Daily    venlafaxine XR (EFFEXOR-XR) 150 mg, oral, Daily, With food        Last Recorded Vitals:  There were no vitals filed for this visit.    Physical Exam    Physical  Patient is alert and oriented x3.  HEENT is unremarkable mucous members are moist  Neck no JVP no bruits upstrokes are full no thyromegaly  Lungs are clear bilaterally.  No wheezing crackles or rales  Heart regular rhythm normal S1-S2 there is no S3 no murmurs are heard.  Abdomen is soft vessels are positive nontender nondistended no  organomegaly no pulsatile masses  Extremities have no edema.  Distal pulses present palpable.  Neuro is grossly nonfocal  Skin has no rashes     Last Labs:  CBC -  Lab Results   Component Value Date    WBC 8.9 09/30/2023    HGB 13.8 09/30/2023    HCT 41.3 09/30/2023    MCV 93 09/30/2023     09/30/2023       CMP -  Lab Results   Component Value Date    CALCIUM 9.2 09/30/2023    PHOS 4.4 04/28/2019    PROT 6.4 09/30/2023    ALBUMIN 4.2 09/30/2023    AST 18 09/30/2023    ALT 19 09/30/2023    ALKPHOS 45 09/30/2023    BILITOT 0.5 09/30/2023       LIPID PANEL -   Lab Results   Component Value Date    CHOL 136 09/30/2023    HDL 47.6 09/30/2023    CHHDL 2.9 09/30/2023    VLDL 29 09/30/2023    TRIG 144 09/30/2023    NHDL 88 09/30/2023       RENAL FUNCTION PANEL -   Lab Results   Component Value Date    K 4.7 09/30/2023    PHOS 4.4 04/28/2019       Lab Results   Component Value Date    BNP 9 05/14/2022    HGBA1C 6.6 (H) 09/30/2023     Procedure  ECHO [07/20/2022]: EF 40-45%. SD = impaired relaxation pattern. Mildly reduced RVSF. LA mild-mod dial.      PHARM NST [07/20/2020]: Normal â€“ 54%     ECHO [07/20/2020]: EF 40-45%      ECHO [07/25/2019]: EF 40-45%. SD = impaired relaxation pattern. Mod reduced RVSF. Global hypokinesis of LV w/minor regional variations.      Right Heart CATH / thrombectomy [04/16/2019, Dr. Willis Anderson]: S/p aspiration thrombectomy to right pulmonary artery (multiple large clots retrieved). Status post left chest drain insertion. Ongoing management of bivalirudin and anticoagulation strategy per vascular medicine and primary team.          Assessment/Plan   1. Cardiomyopathy.  Unclear etiology.  Ejection fraction ranges between 40 to 54%.  On echocardiography it usually in the 40 to 45% range.  Continue GDMT up titration.  He is on carvedilol 50 twice daily.  I will increase the dose of his lisinopril to 40 mg.  He does not appear to be volume overloaded.  He is not currently on any diuretics.  SGLT2  inhibitors are not an option because of previous history of DKA.  He is on semaglutide.  No orthopnea no lower extremity edema and no significant shortness of breath at the lower activities engaging in.    2. Hypertension still somewhat elevated.  I will increase his lisinopril to 40 mg prescription a lot of this is related to just having pain in his back.      3. Atrial septal defect. He had a repair in 1988. On the repeat echo, there is no evidence of leak across the ASD repair.     4. Bilateral pulmonary emboli 2019 status post catheter-based thrombectomy. This is thought to be possibly heparin-induced thrombocytopenia and/or related antibiotics. He is currently off anticoagulation. He is followed by vascular medicine. On the echo, his RV is mildly hypokinetic and slightly enlarged. This may also be related to obesity and sleep apnea.     5.  Hyperlipidemia.  On rosuvastatin.  9/30/2023 LDL 60, HDL 48 triglycerides 144.  Hemoglobin A1c 6.6 these numbers are excellent     RTC 6 months    Jasper Antony MD     Instructions and follow up

## 2024-02-05 DIAGNOSIS — Z79.4 TYPE 2 DIABETES MELLITUS WITHOUT COMPLICATION, WITH LONG-TERM CURRENT USE OF INSULIN (MULTI): Primary | ICD-10-CM

## 2024-02-05 DIAGNOSIS — E11.9 TYPE 2 DIABETES MELLITUS WITHOUT COMPLICATION, WITH LONG-TERM CURRENT USE OF INSULIN (MULTI): Primary | ICD-10-CM

## 2024-02-05 RX ORDER — METFORMIN HYDROCHLORIDE 1000 MG/1
1000 TABLET ORAL 2 TIMES DAILY
Qty: 180 TABLET | Refills: 1 | Status: SHIPPED | OUTPATIENT
Start: 2024-02-05

## 2024-02-06 DIAGNOSIS — M54.16 LUMBAR RADICULAR PAIN: ICD-10-CM

## 2024-02-06 RX ORDER — GABAPENTIN 300 MG/1
CAPSULE ORAL
Qty: 270 CAPSULE | Refills: 0 | Status: SHIPPED | OUTPATIENT
Start: 2024-02-06 | End: 2024-03-20 | Stop reason: SDUPTHER

## 2024-02-20 ENCOUNTER — APPOINTMENT (OUTPATIENT)
Dept: PAIN MEDICINE | Facility: CLINIC | Age: 54
End: 2024-02-20
Payer: COMMERCIAL

## 2024-02-25 DIAGNOSIS — Z79.4 TYPE 2 DIABETES MELLITUS WITHOUT COMPLICATION, WITH LONG-TERM CURRENT USE OF INSULIN (MULTI): ICD-10-CM

## 2024-02-25 DIAGNOSIS — E11.9 TYPE 2 DIABETES MELLITUS WITHOUT COMPLICATION, WITH LONG-TERM CURRENT USE OF INSULIN (MULTI): ICD-10-CM

## 2024-02-25 RX ORDER — SEMAGLUTIDE 2.68 MG/ML
INJECTION, SOLUTION SUBCUTANEOUS
Qty: 9 ML | Refills: 3 | Status: SHIPPED | OUTPATIENT
Start: 2024-02-25

## 2024-02-27 ENCOUNTER — OFFICE VISIT (OUTPATIENT)
Dept: PAIN MEDICINE | Facility: CLINIC | Age: 54
End: 2024-02-27
Payer: COMMERCIAL

## 2024-02-27 VITALS
DIASTOLIC BLOOD PRESSURE: 64 MMHG | TEMPERATURE: 97.3 F | WEIGHT: 315 LBS | BODY MASS INDEX: 39.01 KG/M2 | HEART RATE: 107 BPM | SYSTOLIC BLOOD PRESSURE: 135 MMHG

## 2024-02-27 DIAGNOSIS — M48.062 SPINAL STENOSIS OF LUMBAR REGION WITH NEUROGENIC CLAUDICATION: Primary | ICD-10-CM

## 2024-02-27 PROCEDURE — 4010F ACE/ARB THERAPY RXD/TAKEN: CPT | Performed by: ANESTHESIOLOGY

## 2024-02-27 PROCEDURE — 3078F DIAST BP <80 MM HG: CPT | Performed by: ANESTHESIOLOGY

## 2024-02-27 PROCEDURE — 99213 OFFICE O/P EST LOW 20 MIN: CPT | Performed by: ANESTHESIOLOGY

## 2024-02-27 PROCEDURE — 1036F TOBACCO NON-USER: CPT | Performed by: ANESTHESIOLOGY

## 2024-02-27 PROCEDURE — 3075F SYST BP GE 130 - 139MM HG: CPT | Performed by: ANESTHESIOLOGY

## 2024-02-27 ASSESSMENT — PAIN SCALES - GENERAL
PAINLEVEL: 5
PAINLEVEL_OUTOF10: 5 - MODERATE PAIN
PAINLEVEL_OUTOF10: 5 - MODERATE PAIN

## 2024-02-27 ASSESSMENT — ENCOUNTER SYMPTOMS
FEVER: 0
BACK PAIN: 1
DEPRESSION: 0
LOSS OF SENSATION IN FEET: 1
OCCASIONAL FEELINGS OF UNSTEADINESS: 1
SHORTNESS OF BREATH: 0

## 2024-02-27 ASSESSMENT — PAIN - FUNCTIONAL ASSESSMENT: PAIN_FUNCTIONAL_ASSESSMENT: 0-10

## 2024-02-27 ASSESSMENT — PAIN DESCRIPTION - DESCRIPTORS
DESCRIPTORS: DULL;BURNING
DESCRIPTORS: DULL;BURNING

## 2024-02-27 NOTE — PROGRESS NOTES
Chief Complain  Follow-up (Follow up for lumbar epidural-pt reports he had overall pain of 5/10 with spikes of pain reaching 10/10. Patient reports he is not having the 10/10 spikes any longer.)       History Of Present Illness  Hua Saavedra is a 53 y.o. male here for low back pain. The patient rates the pain at 5  on a scale from 0-10.  The patient describes pain as dull, burning.  The pain is worsened by no aggravating factors identified and is alleviated by ice.  Since the last visit the pain has improved.  The patient denies any fever, chills, weight loss, bladder/bowel incontinence.     Previous Procedures:  L4-5 epidural steroid injection done on 1/5/2024:     Past Medical History  He has a past medical history of Anxiety, Arthritis (2013), Benign prostatic hyperplasia with lower urinary tract symptoms, symptom details unspecified, Bilateral pulmonary embolism (CMS/HCC) (03/15/2023), Cardiomyopathy, dilated (CMS/[facility]), Depression, Diabetes mellitus (CMS/[facility]), GERD (gastroesophageal reflux disease), Heparin-induced thrombocytopenia (CMS/[facility]) (03/15/2023), History of repair of atrial septal defect (01/19/2024), HIT (heparin-induced thrombocytopenia) (CMS/[facility]), HTN (hypertension), benign (03/15/2023), Hypercholesteremia, Hyperlipidemia (03/15/2023), Hyperlipidemia, unspecified hyperlipidemia type, Hypertension, Hypertriglyceridemia, Lumbar radicular pain, Obesity, RADHA (obstructive sleep apnea), Tachycardia (01/19/2024), and Type 2 diabetes mellitus (CMS/HCC) (09/19/2013).    Surgical History  He has a past surgical history that includes ASD repair; Biceps Tenodesis; Embolectomy (04/16/2019); Cardiac surgery (1988); and Spine surgery (2019).     Social History  He reports that he has never smoked. He has never been exposed to tobacco smoke. He has never used smokeless tobacco. He reports current alcohol use of about 3.0 standard drinks of alcohol per week. He reports that he does not use drugs.    Family  History  Family History   Problem Relation Name Age of Onset    Hypertension Mother Nelda     Alcohol abuse Mother Nelda     Valvular heart disease Father Henri     Kidney disease Father Henri     No Known Problems Sister      No Known Problems Brother      No Known Problems Daughter      No Known Problems Son      No Known Problems Mother's Sister      No Known Problems Mother's Brother      No Known Problems Father's Sister      No Known Problems Father's Brother      No Known Problems Maternal Grandmother      No Known Problems Maternal Grandfather      No Known Problems Paternal Grandmother      No Known Problems Paternal Grandfather      No Known Problems Other          Allergies  Heparin    Review of Systems  Review of Systems   Constitutional:  Negative for fever.   Respiratory:  Negative for shortness of breath.    Cardiovascular:  Negative for chest pain.   Musculoskeletal:  Positive for back pain.   Psychiatric/Behavioral:  Negative for suicidal ideas.         Physical Exam  Physical Exam  HENT:      Head: Normocephalic and atraumatic.   Eyes:      Extraocular Movements: Extraocular movements intact.      Pupils: Pupils are equal, round, and reactive to light.   Pulmonary:      Effort: Pulmonary effort is normal.   Musculoskeletal:      Cervical back: Neck supple.   Neurological:      Mental Status: He is alert.   Psychiatric:         Mood and Affect: Mood normal.         Last Recorded Vitals  Blood pressure 135/64, pulse 107, temperature 36.3 °C (97.3 °F), weight 149 kg (329 lb).       Assessment/Plan     Hua Saavedra is a 53 y.o. male here for follow-up of low back pain radiating to left buttock left thigh up to his knee.  He has history of chronic low back issues for last few years.  He is status post lumbar laminectomy at L2-3 done by Dr. Quiroga which did help his symptoms significantly.  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  Worst of them being  at L3-4.  He is status post epidural steroid injection which has provided him with significant relief from his severe flareup of his pain.  He is back to his baseline pain.  Currently managing his pain with combination of over-the-counter medications, gabapentin.  I would continuation of the above regimen, continue with activity modification and regular exercises.  Repeat of epidural steroid injection as needed at least 3 months from the last injection.         Sajan Garcia MD

## 2024-03-20 DIAGNOSIS — M54.16 LUMBAR RADICULAR PAIN: ICD-10-CM

## 2024-03-20 RX ORDER — GABAPENTIN 300 MG/1
CAPSULE ORAL
Qty: 810 CAPSULE | Refills: 0 | Status: SHIPPED | OUTPATIENT
Start: 2024-03-20

## 2024-04-04 ENCOUNTER — TELEPHONE (OUTPATIENT)
Dept: PAIN MEDICINE | Facility: CLINIC | Age: 54
End: 2024-04-04
Payer: COMMERCIAL

## 2024-04-04 DIAGNOSIS — M48.062 SPINAL STENOSIS OF LUMBAR REGION WITH NEUROGENIC CLAUDICATION: Primary | ICD-10-CM

## 2024-04-04 NOTE — TELEPHONE ENCOUNTER
Pt is requesting a Lumbar Epidural injection.  Last office visit was 2/7, No blood thinners, Medical [employer] insurance.  Best #[phone].

## 2024-04-10 ENCOUNTER — APPOINTMENT (OUTPATIENT)
Dept: PAIN MEDICINE | Facility: CLINIC | Age: 54
End: 2024-04-10
Payer: COMMERCIAL

## 2024-04-12 ENCOUNTER — HOSPITAL ENCOUNTER (OUTPATIENT)
Dept: PAIN MEDICINE | Facility: CLINIC | Age: 54
Discharge: HOME | End: 2024-04-12

## 2024-04-12 ENCOUNTER — HOSPITAL ENCOUNTER (OUTPATIENT)
Dept: RADIOLOGY | Facility: CLINIC | Age: 54
Discharge: HOME | End: 2024-04-12

## 2024-04-12 VITALS
WEIGHT: 315 LBS | HEIGHT: 77 IN | OXYGEN SATURATION: 93 % | SYSTOLIC BLOOD PRESSURE: 120 MMHG | RESPIRATION RATE: 18 BRPM | TEMPERATURE: 97.9 F | HEART RATE: 99 BPM | DIASTOLIC BLOOD PRESSURE: 72 MMHG | BODY MASS INDEX: 37.19 KG/M2

## 2024-04-12 DIAGNOSIS — M48.062 SPINAL STENOSIS OF LUMBAR REGION WITH NEUROGENIC CLAUDICATION: ICD-10-CM

## 2024-04-12 PROCEDURE — 62323 NJX INTERLAMINAR LMBR/SAC: CPT | Performed by: ANESTHESIOLOGY

## 2024-04-12 PROCEDURE — 2500000005 HC RX 250 GENERAL PHARMACY W/O HCPCS

## 2024-04-12 PROCEDURE — A4216 STERILE WATER/SALINE, 10 ML: HCPCS

## 2024-04-12 PROCEDURE — 2500000004 HC RX 250 GENERAL PHARMACY W/ HCPCS (ALT 636 FOR OP/ED)

## 2024-04-12 RX ORDER — METHYLPREDNISOLONE ACETATE 40 MG/ML
INJECTION, SUSPENSION INTRA-ARTICULAR; INTRALESIONAL; INTRAMUSCULAR; SOFT TISSUE
Status: COMPLETED
Start: 2024-04-12 | End: 2024-04-12

## 2024-04-12 RX ORDER — SODIUM CHLORIDE 9 MG/ML
INJECTION, SOLUTION INTRAMUSCULAR; INTRAVENOUS; SUBCUTANEOUS
Status: COMPLETED
Start: 2024-04-12 | End: 2024-04-12

## 2024-04-12 RX ORDER — LIDOCAINE HYDROCHLORIDE 10 MG/ML
INJECTION, SOLUTION EPIDURAL; INFILTRATION; INTRACAUDAL; PERINEURAL
Status: COMPLETED
Start: 2024-04-12 | End: 2024-04-12

## 2024-04-12 RX ORDER — ROPIVACAINE HYDROCHLORIDE 5 MG/ML
INJECTION, SOLUTION EPIDURAL; INFILTRATION; PERINEURAL
Status: COMPLETED
Start: 2024-04-12 | End: 2024-04-12

## 2024-04-12 RX ADMIN — ROPIVACAINE HYDROCHLORIDE 100 MG: 5 INJECTION, SOLUTION EPIDURAL; INFILTRATION; PERINEURAL at 11:49

## 2024-04-12 RX ADMIN — LIDOCAINE HYDROCHLORIDE 300 MG: 10 INJECTION, SOLUTION EPIDURAL; INFILTRATION; INTRACAUDAL; PERINEURAL at 11:49

## 2024-04-12 RX ADMIN — SODIUM CHLORIDE 10 ML: 9 INJECTION, SOLUTION INTRAMUSCULAR; INTRAVENOUS; SUBCUTANEOUS at 11:49

## 2024-04-12 RX ADMIN — METHYLPREDNISOLONE ACETATE 40 MG: 40 INJECTION, SUSPENSION INTRA-ARTICULAR; INTRALESIONAL; INTRAMUSCULAR; INTRASYNOVIAL; SOFT TISSUE at 11:49

## 2024-04-12 ASSESSMENT — PAIN SCALES - GENERAL
PAINLEVEL_OUTOF10: 7
PAINLEVEL_OUTOF10: 8

## 2024-04-12 ASSESSMENT — ENCOUNTER SYMPTOMS
DEPRESSION: 0
OCCASIONAL FEELINGS OF UNSTEADINESS: 1
BACK PAIN: 1
LOSS OF SENSATION IN FEET: 1
SHORTNESS OF BREATH: 0

## 2024-04-12 ASSESSMENT — SOCIAL DETERMINANTS OF HEALTH (SDOH): IN THE PAST 12 MONTHS, HAS THE ELECTRIC, GAS, OIL, OR WATER COMPANY THREATENED TO SHUT OFF SERVICE IN YOUR HOME?: NO

## 2024-04-12 ASSESSMENT — PAIN - FUNCTIONAL ASSESSMENT: PAIN_FUNCTIONAL_ASSESSMENT: 0-10

## 2024-04-12 NOTE — OP NOTE
Procedure Note     Date: 2024  OR Location: [facility] NON-OR PROCEDURES    Name: Hua Saavedra, : 1970, Age: 53 y.o., MRN: [id number], Sex: male    Diagnosis  Preprocedure diagnosis: spinal stenosis  Postprocedure diagnosis: Same    Procedures  Lumbar Epidural Steroid Injection    The patient was seen in the preoperative area. The risks, benefits, complications, treatment options, non-operative alternatives, expected recovery and outcomes were discussed with the patient. The patient concurred with the proposed plan, giving informed consent.      Procedure Details:   Lumbar Epidural Steroid Injection Procedure Note      Procedure: The risks and benefits of treatment options and alternatives were discussed with the patient, and consent was obtained for a Lumbar epidural steroid injection. He wishes to proceed. He was placed in a prone position. Area overlying the L4-5 space was cleaned with ChloraPrep solution and draped using standard sterile precautions. Skin was anesthetized with 1% lidocaine. A 6 inch 20 G Touhy needle was advanced with AP, lateral, oblique fluoroscopy to the L4-5 epidural space using loss-of-resistance technique. No paresthesias were induced. 2 ml of Omnipaque was injected demonstrating spread of the dye  in the epidural space. No intravascular spread was noticed. After negative aspiration for blood and CSF, a solution containing Depomedrol 40mg, 1 mL of 0.5% ropivacaine and 3 ml of normal saline was injected without inducing paresthesia or pain. Patient was transferred to recovery in stable condition and subsequently discharged home.        Complications:  None; patient tolerated the procedure well.    Disposition: Home  Condition: stable         Additional Details: NA    Attending Attestation: I performed the procedure.    Sajan Garcia MD

## 2024-04-12 NOTE — H&P
History Of Present Illness  Hua Saavedra is a 53 y.o. male presenting for lumbar epidural steroid injection     Past Medical History  Past Medical History:   Diagnosis Date    Anxiety     Arthritis 2013    Benign prostatic hyperplasia with lower urinary tract symptoms, symptom details unspecified     Bilateral pulmonary embolism (Multi) 03/15/2023    s/p removal at [facility] [place] 4/2019 due to the HIT. Now off coumadin. Followed with Dr. Matthews    Cardiomyopathy, dilated (Multi)     Depression     Diabetes mellitus (Multi)     Type 2    GERD (gastroesophageal reflux disease)     Heparin-induced thrombocytopenia (Multi) 03/15/2023    4/2019 likely due to ABX. Complicated with DVT/PE    History of repair of atrial septal defect 01/19/2024    Congenital ASD repair 4/1988    HIT (heparin-induced thrombocytopenia) (Multi)     HTN (hypertension), benign 03/15/2023    Hypercholesteremia     Hyperlipidemia 03/15/2023    PCP follows    Hyperlipidemia, unspecified hyperlipidemia type     Hypertension     Hypertriglyceridemia     Lumbar radicular pain     Obesity     RADHA (obstructive sleep apnea)     Tachycardia 01/19/2024    Type 2 diabetes mellitus (Multi) 09/19/2013       Surgical History  Past Surgical History:   Procedure Laterality Date    ASD REPAIR      BICEPS TENODESIS      CARDIAC SURGERY  1988    EMBOLECTOMY  04/16/2019    Pulmonary    SPINE SURGERY  2019        Social History  He reports that he has never smoked. He has never been exposed to tobacco smoke. He has never used smokeless tobacco. He reports current alcohol use of about 3.0 standard drinks of alcohol per week. He reports that he does not use drugs.    Family History  Family History   Problem Relation Name Age of Onset    Hypertension Mother Nelda     Alcohol abuse Mother Nelda     Valvular heart disease Father Henri     Kidney disease Father Henri     No Known Problems Sister      No Known Problems Brother      No Known Problems Daughter      No Known Problems  "Son      No Known Problems Mother's Sister      No Known Problems Mother's Brother      No Known Problems Father's Sister      No Known Problems Father's Brother      No Known Problems Maternal Grandmother      No Known Problems Maternal Grandfather      No Known Problems Paternal Grandmother      No Known Problems Paternal Grandfather      No Known Problems Other          Allergies  Heparin    Review of Systems   Respiratory:  Negative for shortness of breath.    Cardiovascular:  Negative for chest pain.   Musculoskeletal:  Positive for back pain.        Physical Exam  Cardiovascular:      Rate and Rhythm: Normal rate.   Pulmonary:      Effort: Pulmonary effort is normal.   Neurological:      Mental Status: He is alert and oriented to person, place, and time.          Last Recorded Vitals  Blood pressure 120/72, pulse 92, temperature 36.6 °C (97.9 °F), resp. rate 18, height 1.956 m (6' 5\"), weight 145 kg (320 lb), SpO2 95%.      Assessment/Plan       Patient does not have any contraindications for the planned procedure.      Sajan Garcia MD    "

## 2024-05-08 DIAGNOSIS — F32.A ANXIETY AND DEPRESSION: ICD-10-CM

## 2024-05-08 DIAGNOSIS — F41.9 ANXIETY AND DEPRESSION: ICD-10-CM

## 2024-05-09 ENCOUNTER — OFFICE VISIT (OUTPATIENT)
Dept: PRIMARY CARE | Facility: CLINIC | Age: 54
End: 2024-05-09
Payer: COMMERCIAL

## 2024-05-09 VITALS
RESPIRATION RATE: 18 BRPM | HEIGHT: 77 IN | WEIGHT: 315 LBS | OXYGEN SATURATION: 97 % | HEART RATE: 88 BPM | DIASTOLIC BLOOD PRESSURE: 71 MMHG | TEMPERATURE: 96.4 F | SYSTOLIC BLOOD PRESSURE: 105 MMHG | BODY MASS INDEX: 37.19 KG/M2

## 2024-05-09 DIAGNOSIS — F32.A ANXIETY AND DEPRESSION: ICD-10-CM

## 2024-05-09 DIAGNOSIS — F41.9 ANXIETY AND DEPRESSION: ICD-10-CM

## 2024-05-09 DIAGNOSIS — E11.42 DIABETIC POLYNEUROPATHY ASSOCIATED WITH TYPE 2 DIABETES MELLITUS (MULTI): ICD-10-CM

## 2024-05-09 PROCEDURE — 99214 OFFICE O/P EST MOD 30 MIN: CPT | Performed by: FAMILY MEDICINE

## 2024-05-09 PROCEDURE — 4010F ACE/ARB THERAPY RXD/TAKEN: CPT | Performed by: FAMILY MEDICINE

## 2024-05-09 PROCEDURE — 3074F SYST BP LT 130 MM HG: CPT | Performed by: FAMILY MEDICINE

## 2024-05-09 PROCEDURE — 1036F TOBACCO NON-USER: CPT | Performed by: FAMILY MEDICINE

## 2024-05-09 PROCEDURE — 3078F DIAST BP <80 MM HG: CPT | Performed by: FAMILY MEDICINE

## 2024-05-09 RX ORDER — VENLAFAXINE HYDROCHLORIDE 150 MG/1
150 CAPSULE, EXTENDED RELEASE ORAL DAILY
Qty: 90 CAPSULE | Refills: 3 | OUTPATIENT
Start: 2024-05-09

## 2024-05-09 RX ORDER — VENLAFAXINE HYDROCHLORIDE 150 MG/1
150 CAPSULE, EXTENDED RELEASE ORAL DAILY
Qty: 90 CAPSULE | Refills: 3 | Status: SHIPPED | OUTPATIENT
Start: 2024-05-09 | End: 2025-05-09

## 2024-05-09 ASSESSMENT — PATIENT HEALTH QUESTIONNAIRE - PHQ9
2. FEELING DOWN, DEPRESSED OR HOPELESS: NOT AT ALL
SUM OF ALL RESPONSES TO PHQ9 QUESTIONS 1 AND 2: 0
1. LITTLE INTEREST OR PLEASURE IN DOING THINGS: NOT AT ALL

## 2024-05-09 ASSESSMENT — ENCOUNTER SYMPTOMS
DEPRESSION: 0
OCCASIONAL FEELINGS OF UNSTEADINESS: 1
LOSS OF SENSATION IN FEET: 1

## 2024-05-09 ASSESSMENT — PAIN SCALES - GENERAL: PAINLEVEL: 3

## 2024-05-09 NOTE — PROGRESS NOTES
"Subjective   Patient ID: Hua Saavedra is a 53 y.o. male who presents for New Patient Visit and Establish Care.    HPI :CDM  Chronic low back pain  Fall: off balance  Anxiety with depression  Insulin dependent DM  Cardiomyopathy  HTN  hyperlipidemia    Review of Systems   All other systems reviewed and are negative.      Objective   /71 (BP Location: Left arm, Patient Position: Sitting, BP Cuff Size: Large adult)   Pulse 88   Temp 35.8 °C (96.4 °F) (Temporal)   Resp 18   Ht 1.956 m (6' 5\")   Wt 149 kg (329 lb)   SpO2 97%   BMI 39.01 kg/m²     Physical Exam  Vitals and nursing note reviewed.   Cardiovascular:      Rate and Rhythm: Normal rate and regular rhythm.   Pulmonary:      Effort: Pulmonary effort is normal.      Breath sounds: Normal breath sounds.   Musculoskeletal:      Cervical back: Neck supple.   Neurological:      Mental Status: He is alert.      Comments: Uses cane to support   Psychiatric:         Mood and Affect: Mood normal.         Behavior: Behavior normal.         Thought Content: Thought content normal.         Judgment: Judgment normal.         Assessment/Plan   Diagnoses and all orders for this visit:  Anxiety and depression  -     venlafaxine XR (Effexor-XR) 150 mg 24 hr capsule; Take 1 capsule (150 mg) by mouth once daily. With food  Diabetic polyneuropathy associated with type 2 diabetes mellitus (Multi)  -     Methylmalonic acid, serum; Future  -     Colonoscopy Screening; Average Risk Patient; Future  Other orders  -     Follow Up In Primary Care - Established; Future         Patient was identified as a fall risk. Risk prevention instructions provided.  "

## 2024-05-17 DIAGNOSIS — N40.1 BENIGN PROSTATIC HYPERPLASIA WITH LOWER URINARY TRACT SYMPTOMS, SYMPTOM DETAILS UNSPECIFIED: ICD-10-CM

## 2024-05-20 RX ORDER — TAMSULOSIN HYDROCHLORIDE 0.4 MG/1
0.8 CAPSULE ORAL DAILY
Qty: 90 CAPSULE | Refills: 7 | Status: SHIPPED | OUTPATIENT
Start: 2024-05-20

## 2024-06-04 ENCOUNTER — OFFICE VISIT (OUTPATIENT)
Dept: PAIN MEDICINE | Facility: CLINIC | Age: 54
End: 2024-06-04
Payer: COMMERCIAL

## 2024-06-04 VITALS
HEIGHT: 77 IN | OXYGEN SATURATION: 96 % | SYSTOLIC BLOOD PRESSURE: 119 MMHG | TEMPERATURE: 97.2 F | RESPIRATION RATE: 18 BRPM | DIASTOLIC BLOOD PRESSURE: 79 MMHG | BODY MASS INDEX: 37.19 KG/M2 | WEIGHT: 315 LBS | HEART RATE: 110 BPM

## 2024-06-04 DIAGNOSIS — M25.552 BILATERAL HIP PAIN: Primary | ICD-10-CM

## 2024-06-04 DIAGNOSIS — M48.062 SPINAL STENOSIS OF LUMBAR REGION WITH NEUROGENIC CLAUDICATION: ICD-10-CM

## 2024-06-04 DIAGNOSIS — M25.551 BILATERAL HIP PAIN: Primary | ICD-10-CM

## 2024-06-04 PROCEDURE — 4010F ACE/ARB THERAPY RXD/TAKEN: CPT | Performed by: ANESTHESIOLOGY

## 2024-06-04 PROCEDURE — 99213 OFFICE O/P EST LOW 20 MIN: CPT | Performed by: ANESTHESIOLOGY

## 2024-06-04 PROCEDURE — 3078F DIAST BP <80 MM HG: CPT | Performed by: ANESTHESIOLOGY

## 2024-06-04 PROCEDURE — 1036F TOBACCO NON-USER: CPT | Performed by: ANESTHESIOLOGY

## 2024-06-04 PROCEDURE — 3074F SYST BP LT 130 MM HG: CPT | Performed by: ANESTHESIOLOGY

## 2024-06-04 SDOH — ECONOMIC STABILITY: FOOD INSECURITY: WITHIN THE PAST 12 MONTHS, YOU WORRIED THAT YOUR FOOD WOULD RUN OUT BEFORE YOU GOT MONEY TO BUY MORE.: NEVER TRUE

## 2024-06-04 SDOH — ECONOMIC STABILITY: FOOD INSECURITY: WITHIN THE PAST 12 MONTHS, THE FOOD YOU BOUGHT JUST DIDN'T LAST AND YOU DIDN'T HAVE MONEY TO GET MORE.: NEVER TRUE

## 2024-06-04 ASSESSMENT — LIFESTYLE VARIABLES
HOW OFTEN DO YOU HAVE A DRINK CONTAINING ALCOHOL: NEVER
AUDIT-C TOTAL SCORE: 0
HOW OFTEN DO YOU HAVE SIX OR MORE DRINKS ON ONE OCCASION: NEVER
SKIP TO QUESTIONS 9-10: 1
HOW MANY STANDARD DRINKS CONTAINING ALCOHOL DO YOU HAVE ON A TYPICAL DAY: PATIENT DOES NOT DRINK

## 2024-06-04 ASSESSMENT — PAIN SCALES - GENERAL
PAINLEVEL_OUTOF10: 6
PAINLEVEL: 6

## 2024-06-04 ASSESSMENT — ENCOUNTER SYMPTOMS
SHORTNESS OF BREATH: 0
OCCASIONAL FEELINGS OF UNSTEADINESS: 1
LOSS OF SENSATION IN FEET: 1
BACK PAIN: 1
FEVER: 0
DEPRESSION: 1
UNEXPECTED WEIGHT CHANGE: 0
BLOOD IN STOOL: 0

## 2024-06-04 ASSESSMENT — COLUMBIA-SUICIDE SEVERITY RATING SCALE - C-SSRS
1. IN THE PAST MONTH, HAVE YOU WISHED YOU WERE DEAD OR WISHED YOU COULD GO TO SLEEP AND NOT WAKE UP?: NO
6. HAVE YOU EVER DONE ANYTHING, STARTED TO DO ANYTHING, OR PREPARED TO DO ANYTHING TO END YOUR LIFE?: NO
2. HAVE YOU ACTUALLY HAD ANY THOUGHTS OF KILLING YOURSELF?: NO

## 2024-06-04 ASSESSMENT — PAIN - FUNCTIONAL ASSESSMENT: PAIN_FUNCTIONAL_ASSESSMENT: 0-10

## 2024-06-04 ASSESSMENT — PAIN DESCRIPTION - DESCRIPTORS: DESCRIPTORS: ACHING;BURNING

## 2024-06-04 NOTE — PROGRESS NOTES
Chief Complain  Follow-up (Had Lesi on 4/12 with 70% relief that lasted up until 3 weeks agO/Pain is back to about a 6/10 in in back down)       History Of Present Illness  Hua Saavedra is a 53 y.o. male here for buttocks and hips. The patient rates the pain at 5  on a scale from 0-10.  The patient describes pain as dull, burning.  The pain is worsened by walking and is alleviated by sitting.  Since the last visit the pain has improved.  The patient denies any fever, chills, weight loss, bladder/bowel incontinence.         Past Medical History  He has a past medical history of Anxiety, Arthritis (2013), Benign prostatic hyperplasia with lower urinary tract symptoms, symptom details unspecified, Bilateral pulmonary embolism (Multi) (03/15/2023), Cardiomyopathy, dilated (Multi), Depression, Diabetes mellitus (Multi), GERD (gastroesophageal reflux disease), Heparin-induced thrombocytopenia (Multi) (03/15/2023), History of repair of atrial septal defect (01/19/2024), HIT (heparin-induced thrombocytopenia) (Multi), HTN (hypertension), benign (03/15/2023), Hypercholesteremia, Hyperlipidemia (03/15/2023), Hyperlipidemia, unspecified hyperlipidemia type, Hypertension, Hypertriglyceridemia, Lumbar radicular pain, Obesity, RADHA (obstructive sleep apnea), Tachycardia (01/19/2024), and Type 2 diabetes mellitus (Multi) (09/19/2013).    Surgical History  He has a past surgical history that includes ASD repair; Biceps Tenodesis; Embolectomy (04/16/2019); Cardiac surgery (1988); and Spine surgery (2019).     Social History  He reports that he has never smoked. He has never been exposed to tobacco smoke. He has never used smokeless tobacco. He reports current alcohol use of about 3.0 standard drinks of alcohol per week. He reports that he does not use drugs.    Family History  Family History   Problem Relation Name Age of Onset    Hypertension Mother Nelda     Alcohol abuse Mother Nelda     Valvular heart disease Father Henri   "   Kidney disease Father Henri     No Known Problems Sister      No Known Problems Brother      No Known Problems Daughter      No Known Problems Son      No Known Problems Mother's Sister      No Known Problems Mother's Brother      No Known Problems Father's Sister      No Known Problems Father's Brother      No Known Problems Maternal Grandmother      No Known Problems Maternal Grandfather      No Known Problems Paternal Grandmother      No Known Problems Paternal Grandfather      No Known Problems Other          Allergies  Heparin    Review of Systems  Review of Systems   Constitutional:  Negative for fever and unexpected weight change.   Respiratory:  Negative for shortness of breath.    Cardiovascular:  Negative for chest pain.   Gastrointestinal:  Negative for blood in stool.   Genitourinary:         -ve for bladder or bowel incontinence    Musculoskeletal:  Positive for back pain.   Neurological:         -ve Balance issues/fine motor skill problems   Psychiatric/Behavioral:  Negative for suicidal ideas.         Physical Exam  Physical Exam  HENT:      Head: Normocephalic and atraumatic.   Eyes:      Extraocular Movements: Extraocular movements intact.      Pupils: Pupils are equal, round, and reactive to light.   Pulmonary:      Effort: Pulmonary effort is normal.   Musculoskeletal:      Cervical back: Neck supple.   Neurological:      Mental Status: He is alert.   Psychiatric:         Mood and Affect: Mood normal.         Last Recorded Vitals  Blood pressure 119/79, pulse 110, temperature 36.2 °C (97.2 °F), resp. rate 18, height 1.956 m (6' 5\"), weight 149 kg (329 lb), SpO2 96%.       Assessment/Plan     Hua Saavedra is a 53 y.o. male here for follow-up of chronic bilateral, lateral thigh and groin pain.  Does have multiple level spinal canal stenosis worst being at L3-4.  He is status post L3-4 epidural steroid injection which provided him with significant relief of pain lasting for several weeks now " however for past 2 to 3 weeks the pain has started to come back.  He also has been doing physical therapy.  On physical examination today he does have reduced range of motion of both hips.  I would recommend getting updated x-rays of his hip joints for further evaluation if significant osteoarthritis would refer him to orthopedics.  Otherwise we will continue with physical therapy.    Total time spent caring for the patient today was 23 minutes. This includes time spent before the visit reviewing the chart, time spent during the visit, and time spent after the visit on documentation.      Sajan Garcia MD

## 2024-06-24 DIAGNOSIS — K21.9 CHRONIC GERD: ICD-10-CM

## 2024-06-24 DIAGNOSIS — M54.16 LUMBAR RADICULAR PAIN: ICD-10-CM

## 2024-06-25 ENCOUNTER — APPOINTMENT (OUTPATIENT)
Dept: OPERATING ROOM | Facility: CLINIC | Age: 54
End: 2024-06-25
Payer: COMMERCIAL

## 2024-06-26 RX ORDER — PANTOPRAZOLE SODIUM 40 MG/1
40 TABLET, DELAYED RELEASE ORAL DAILY
Qty: 90 TABLET | Refills: 3 | Status: SHIPPED | OUTPATIENT
Start: 2024-06-26

## 2024-07-12 ENCOUNTER — ANESTHESIA EVENT (OUTPATIENT)
Dept: OPERATING ROOM | Facility: CLINIC | Age: 54
End: 2024-07-12
Payer: COMMERCIAL

## 2024-07-18 ENCOUNTER — HOSPITAL ENCOUNTER (OUTPATIENT)
Dept: OPERATING ROOM | Facility: CLINIC | Age: 54
Discharge: HOME | End: 2024-07-18
Payer: COMMERCIAL

## 2024-07-18 ENCOUNTER — ANESTHESIA (OUTPATIENT)
Dept: OPERATING ROOM | Facility: CLINIC | Age: 54
End: 2024-07-18
Payer: COMMERCIAL

## 2024-07-18 VITALS
OXYGEN SATURATION: 97 % | HEIGHT: 77 IN | TEMPERATURE: 98.2 F | HEART RATE: 90 BPM | RESPIRATION RATE: 18 BRPM | SYSTOLIC BLOOD PRESSURE: 121 MMHG | BODY MASS INDEX: 37.19 KG/M2 | WEIGHT: 315 LBS | DIASTOLIC BLOOD PRESSURE: 84 MMHG

## 2024-07-18 DIAGNOSIS — E11.42 DIABETIC POLYNEUROPATHY ASSOCIATED WITH TYPE 2 DIABETES MELLITUS (MULTI): ICD-10-CM

## 2024-07-18 LAB — GLUCOSE BLD MANUAL STRIP-MCNC: 138 MG/DL (ref 74–99)

## 2024-07-18 PROCEDURE — 3600000007 HC OR TIME - EACH INCREMENTAL 1 MINUTE - PROCEDURE LEVEL TWO: Performed by: ANESTHESIOLOGY

## 2024-07-18 PROCEDURE — 7100000009 HC PHASE TWO TIME - INITIAL BASE CHARGE: Performed by: ANESTHESIOLOGY

## 2024-07-18 PROCEDURE — 82947 ASSAY GLUCOSE BLOOD QUANT: CPT

## 2024-07-18 PROCEDURE — 3700000002 HC GENERAL ANESTHESIA TIME - EACH INCREMENTAL 1 MINUTE: Performed by: ANESTHESIOLOGY

## 2024-07-18 PROCEDURE — 3700000001 HC GENERAL ANESTHESIA TIME - INITIAL BASE CHARGE: Performed by: ANESTHESIOLOGY

## 2024-07-18 PROCEDURE — 2500000004 HC RX 250 GENERAL PHARMACY W/ HCPCS (ALT 636 FOR OP/ED): Performed by: NURSE ANESTHETIST, CERTIFIED REGISTERED

## 2024-07-18 PROCEDURE — 3600000002 HC OR TIME - INITIAL BASE CHARGE - PROCEDURE LEVEL TWO: Performed by: ANESTHESIOLOGY

## 2024-07-18 PROCEDURE — 45378 DIAGNOSTIC COLONOSCOPY: CPT | Performed by: COLON & RECTAL SURGERY

## 2024-07-18 PROCEDURE — 7100000010 HC PHASE TWO TIME - EACH INCREMENTAL 1 MINUTE: Performed by: ANESTHESIOLOGY

## 2024-07-18 RX ORDER — LIDOCAINE IN NACL,ISO-OSMOT/PF 30 MG/3 ML
0.1 SYRINGE (ML) INJECTION ONCE
Status: DISCONTINUED | OUTPATIENT
Start: 2024-07-18 | End: 2024-07-19 | Stop reason: HOSPADM

## 2024-07-18 RX ORDER — SODIUM CHLORIDE, SODIUM LACTATE, POTASSIUM CHLORIDE, CALCIUM CHLORIDE 600; 310; 30; 20 MG/100ML; MG/100ML; MG/100ML; MG/100ML
100 INJECTION, SOLUTION INTRAVENOUS CONTINUOUS
Status: DISCONTINUED | OUTPATIENT
Start: 2024-07-18 | End: 2024-07-19 | Stop reason: HOSPADM

## 2024-07-18 RX ORDER — ONDANSETRON HYDROCHLORIDE 2 MG/ML
INJECTION, SOLUTION INTRAVENOUS AS NEEDED
Status: DISCONTINUED | OUTPATIENT
Start: 2024-07-18 | End: 2024-07-18

## 2024-07-18 RX ORDER — ONDANSETRON HYDROCHLORIDE 2 MG/ML
4 INJECTION, SOLUTION INTRAVENOUS ONCE AS NEEDED
Status: DISCONTINUED | OUTPATIENT
Start: 2024-07-18 | End: 2024-07-19 | Stop reason: HOSPADM

## 2024-07-18 RX ORDER — MIDAZOLAM HYDROCHLORIDE 1 MG/ML
INJECTION, SOLUTION INTRAMUSCULAR; INTRAVENOUS AS NEEDED
Status: DISCONTINUED | OUTPATIENT
Start: 2024-07-18 | End: 2024-07-18

## 2024-07-18 RX ORDER — PROPOFOL 10 MG/ML
INJECTION, EMULSION INTRAVENOUS AS NEEDED
Status: DISCONTINUED | OUTPATIENT
Start: 2024-07-18 | End: 2024-07-18

## 2024-07-18 RX ORDER — SODIUM CHLORIDE, SODIUM LACTATE, POTASSIUM CHLORIDE, CALCIUM CHLORIDE 600; 310; 30; 20 MG/100ML; MG/100ML; MG/100ML; MG/100ML
INJECTION, SOLUTION INTRAVENOUS CONTINUOUS PRN
Status: DISCONTINUED | OUTPATIENT
Start: 2024-07-18 | End: 2024-07-18

## 2024-07-18 SDOH — HEALTH STABILITY: MENTAL HEALTH: CURRENT SMOKER: 0

## 2024-07-18 ASSESSMENT — ENCOUNTER SYMPTOMS
NEUROLOGICAL NEGATIVE: 1
CONSTITUTIONAL NEGATIVE: 1
PSYCHIATRIC NEGATIVE: 1
GASTROINTESTINAL NEGATIVE: 1
CARDIOVASCULAR NEGATIVE: 1
RESPIRATORY NEGATIVE: 1

## 2024-07-18 ASSESSMENT — PAIN SCALES - GENERAL
PAINLEVEL_OUTOF10: 0 - NO PAIN

## 2024-07-18 ASSESSMENT — COLUMBIA-SUICIDE SEVERITY RATING SCALE - C-SSRS
2. HAVE YOU ACTUALLY HAD ANY THOUGHTS OF KILLING YOURSELF?: NO
6. HAVE YOU EVER DONE ANYTHING, STARTED TO DO ANYTHING, OR PREPARED TO DO ANYTHING TO END YOUR LIFE?: NO
1. IN THE PAST MONTH, HAVE YOU WISHED YOU WERE DEAD OR WISHED YOU COULD GO TO SLEEP AND NOT WAKE UP?: NO

## 2024-07-18 ASSESSMENT — PAIN - FUNCTIONAL ASSESSMENT
PAIN_FUNCTIONAL_ASSESSMENT: 0-10
PAIN_FUNCTIONAL_ASSESSMENT: 0-10

## 2024-07-18 NOTE — DISCHARGE INSTRUCTIONS
Patient Instructions after a Colonoscopy      The anesthetics, sedatives or narcotics which were given to you today will be acting in your body for the next 24 hours, so you might feel a little sleepy or groggy.  This feeling should slowly wear off. Carefully read and follow the instructions.     You received sedation today:  - Do not drive or operate any machinery or power tools of any kind.   - No alcoholic beverages today, not even beer or wine.  - Do not make any important decisions or sign any legal documents.  - No over the counter medications that contain alcohol or that may cause drowsiness.  - Do not make any important decisions or sign any legal documents.  - Make sure you have someone with you for first 24 hours.    While it is common to experience mild to moderate abdominal distention, gas, or belching after your procedure, if any of these symptoms occur following discharge from the GI Lab or within one week of having your procedure, call the [facility] to be advised whether a visit to your nearest Urgent Care or Emergency Department is indicated.  Take this paper with you if you go.     - If you develop an allergic reaction to the medications that were given during your procedure such as difficulty breathing, rash, hives, severe nausea, vomiting or lightheadedness.  - If you experience chest pain, shortness of breath, severe abdominal pain, fevers and chills.  -If you develop signs and symptoms of bleeding such as blood in your spit, if your stools turn black, tarry, or bloody  - If you have not urinated within 8 hours following your procedure.  - If your IV site becomes painful, red, inflamed, or looks infected.    If you received a biopsy/polypectomy/sphincterotomy the following instructions apply below:    __ Do not use Aspirin containing products, non-steroidal medications or anti-coagulants for one week following your procedure. (Examples of these types of medications are: Advil,  Arthrotec, Aleve, Coumadin, Ecotrin, Heparin, Ibuprofen, Indocin, Motrin, Naprosyn, Nuprin, Plavix, Vioxx, and Voltarin, or their generic forms.  This list is not all-inclusive.  Check with your physician or pharmacist before resuming medications.)   __ Eat a soft diet today.  Avoid foods that are poorly digested for the next 24 hours.  These foods would include: nuts, beans, lettuce, red meats, and fried foods. Start with liquids and advance your diet as tolerated, gradually work up to eating solids.   __ Do not have a Barium Study or Enema for one week.    Your physician recommends the additional following instructions:    -You have a contact number available for emergencies. The signs and symptoms of potential delayed complications were discussed with you. You may return to normal activities tomorrow.  -Resume your previous diet.  -Continue your present medications.   -We are waiting for your pathology results.  -Your physician has recommended a repeat colonoscopy (date to be determined after pending pathology results are reviewed) for surveillance based on pathology results.  -The findings and recommendations have been discussed with you.  -The findings and recommendations were discussed with your family.  - Please see Medication Reconciliation Form for new medication/medications prescribed.       If you experience any problems or have any questions following discharge from the GI Lab, please call:        Nurse Signature                                                                        Date___________________                                                                            Patient/Responsible Party Signature                                        Date___________________

## 2024-07-18 NOTE — ANESTHESIA PREPROCEDURE EVALUATION
Patient: Hua Saavedra    Procedure Information       Date/Time: 07/18/24 0730    Scheduled providers: Marko Gastelum MD    Procedure: COLONOSCOPY    Location: [facility] OR            Relevant Problems   Cardiac   (+) HTN (hypertension), benign   (+) Hyperlipidemia      Pulmonary   (+) Bilateral pulmonary embolism (Multi) (2019)   (+) RADHA (obstructive sleep apnea) (Uses CPAP)      Neuro   (+) Carpal tunnel syndrome of left wrist   (+) Chronic lumbar radiculopathy   (+) Mixed anxiety depressive disorder   (+) Sciatica neuralgia      GI   (+) Chronic GERD      /Renal   (+) BPH (benign prostatic hyperplasia)      Endocrine   (+) Class 2 obesity with body mass index (BMI) of 36.0 to 36.9 in adult (On Ozempic, last dose 7/14)   (+) Diabetic neuropathy (Multi)   (+) Type 2 diabetes mellitus (Multi)      Hematology   (+) Heparin-induced thrombocytopenia (Multi)      Musculoskeletal   (+) Carpal tunnel syndrome of left wrist   (+) Lumbar disc disease   (+) Lumbar disc herniation   (+) Lumbar spondylosis   (+) Spinal stenosis, lumbar           NPO Detail:  NPO/Void Status  Date of Last Liquid: 07/18/24  Time of Last Liquid: 0300 (prep)  Date of Last Solid: 07/17/24  Time of Last Solid: 0630       Patient: Hua Saavedra    Procedure Information       Date/Time: 07/18/24 0730    Scheduled providers: Marko Gastelum MD    Procedure: COLONOSCOPY    Location: [facility] OR            [unfilled]    Clinical information reviewed:   Tobacco  Allergies  Meds   Med Hx  Surg Hx   Fam Hx  Soc Hx        NPO/Void Status  Date of Last Liquid: 07/18/24  Time of Last Liquid: 0300 (prep)  Date of Last Solid: 07/17/24  Time of Last Solid: 0630           Past Medical History:   Diagnosis Date   • Anxiety    • Arthritis 2013   • Benign prostatic hyperplasia with lower urinary tract symptoms, symptom details unspecified    • Bilateral pulmonary embolism (Multi) 03/15/2023    s/p removal at [facility] [place]  4/2019 due to the HIT. Now off coumadin. Followed with Dr. Matthews   • Cardiomyopathy, dilated (Multi)    • Depression    • Diabetes mellitus (Multi)     Type 2   • GERD (gastroesophageal reflux disease)    • Heparin-induced thrombocytopenia (Multi) 03/15/2023    4/2019 likely due to ABX. Complicated with DVT/PE   • History of repair of atrial septal defect 01/19/2024    Congenital ASD repair 4/1988   • HIT (heparin-induced thrombocytopenia) (Multi)    • HTN (hypertension), benign 03/15/2023   • Hypercholesteremia    • Hyperlipidemia 03/15/2023    PCP follows   • Hyperlipidemia, unspecified hyperlipidemia type    • Hypertension    • Hypertriglyceridemia    • Lumbar radicular pain    • Obesity    • RADHA (obstructive sleep apnea)    • Tachycardia 01/19/2024   • Type 2 diabetes mellitus (Multi) 09/19/2013      Past Surgical History:   Procedure Laterality Date   • ASD REPAIR     • BICEPS TENODESIS     • CARDIAC SURGERY  1988   • EMBOLECTOMY  04/16/2019    Pulmonary   • SPINE SURGERY  2019     Social History     Tobacco Use   • Smoking status: Never     Passive exposure: Never   • Smokeless tobacco: Never   Vaping Use   • Vaping status: Never Used   Substance Use Topics   • Alcohol use: Yes     Alcohol/week: 3.0 standard drinks of alcohol     Types: 3 Shots of liquor per week     Comment: Rarely      Current Outpatient Medications   Medication Instructions   • acetaminophen (Tylenol) 325 mg tablet 1-2 tablets, oral, Every 4 hours PRN   • carvedilol (COREG) 50 mg, oral, 2 times daily   • FreeStyle Leisa 2 Sensor kit USE AS DIRECTED AND CHANGE EVERY 14 DAYS.   • gabapentin (Neurontin) 300 mg capsule TAKE 3 CAPSULES IN THE MORNING AND 3 CAPSULES BEFORE BEDTIME   • insulin glargine (LANTUS) 44 Units, subcutaneous, 2 times daily, Take as directed per insulin instructions.   • lisinopril 40 mg, oral, Daily   • metFORMIN (GLUCOPHAGE) 1,000 mg, oral, 2 times daily   • naproxen (NAPROSYN) 500 mg, oral, 2 times daily PRN   •  "Ozempic 2 mg/dose (8 mg/3 mL) pen injector INJECT 2 MG WEEKLY   • pantoprazole (PROTONIX) 40 mg, oral, Daily   • rosuvastatin (CRESTOR) 20 mg, oral, Daily   • tamsulosin (FLOMAX) 0.8 mg, oral, Daily   • venlafaxine XR (EFFEXOR-XR) 150 mg, oral, Daily, With food      Allergies   Allergen Reactions   • Heparin Other     Low platelet count        Chemistry    Lab Results   Component Value Date/Time     09/30/2023 0839    K 4.7 09/30/2023 0839     09/30/2023 0839    CO2 24 09/30/2023 0839    BUN 20 09/30/2023 0839    CREATININE 0.79 09/30/2023 0839    Lab Results   Component Value Date/Time    CALCIUM 9.2 09/30/2023 0839    ALKPHOS 45 09/30/2023 0839    AST 18 09/30/2023 0839    ALT 19 09/30/2023 0839    BILITOT 0.5 09/30/2023 0839          Lab Results   Component Value Date/Time    WBC 8.9 09/30/2023 0839    HGB 13.8 09/30/2023 0839    HCT 41.3 09/30/2023 0839     09/30/2023 0839     Lab Results   Component Value Date/Time    PROTIME 10.8 01/13/2022 1058    INR 0.9 01/13/2022 1058     Encounter Date: 01/25/24   ECG 12 Lead    Narrative    See muse     No results found for this or any previous visit from the past 1095 days.       Visit Vitals  /83   Pulse 93   Temp 36.2 °C (97.2 °F) (Temporal)   Resp 16   Ht 1.956 m (6' 5\")   Wt 146 kg (320 lb 12.3 oz)   SpO2 97%   BMI 38.04 kg/m²   Smoking Status Never   BSA 2.82 m²       ECHO 7/20/22  CONCLUSIONS:   1. The left ventricular systolic function is moderately decreased with a 40-45% estimated ejection fraction.   2. Spectral Doppler shows an impaired relaxation pattern of left ventricular diastolic filling.   3. There is mildly reduced right ventricular systolic function.   4. The left atrium is mild to moderately dilated.    EKG 1/25/24: ST    Nuclear ST 7/2020:  IMPRESSION:  1. Normal stress myocardial perfusion imaging in response to  pharmacologic stress.  2. Well-maintained left ventricular function.  54%     Anesthesia Evaluation     Patient " summary reviewed and Nursing notes reviewed    No history of anesthetic complications   Airway   Mallampati: III  TM distance: >3 FB  Neck ROM: full  Dental - normal exam     Pulmonary     breath sounds clear to auscultation  (+) sleep apnea  Cardiovascular   (+) hypertension    ECG reviewed  Rhythm: regular  Rate: normal    Neuro/Psych - negative ROS     GI/Hepatic/Renal    (+) GERD    Endo/Other    (+) diabetes mellitus  Abdominal   (+) obese    Abdomen: soft.                    Physical Exam    Airway  Mallampati: III  TM distance: >3 FB  Neck ROM: full     Cardiovascular   Rhythm: regular  Rate: normal     Dental - normal exam     Pulmonary   Breath sounds clear to auscultation     Abdominal   (+) obese  Abdomen: soft            Anesthesia Plan    History of general anesthesia?: yes  History of complications of general anesthesia?: no    ASA 3     MAC     The patient is not a current smoker.  Education provided regarding risk of obstructive sleep apnea.  intravenous induction   Anesthetic plan and risks discussed with patient.    Plan discussed with CRNA and attending.

## 2024-07-18 NOTE — PERIOPERATIVE NURSING NOTE
Patient is A&O x4, VSS, respers even and unlabored. Discharge instructions, follow up, and medications reviewed. Questions answered, patient verbalized understanding.  Yuko GREER RN at bedside with discharge instructions

## 2024-07-18 NOTE — H&P
No chief complaint on file.      History Of Present Illness  Hua Saavedra is a 54 y.o. male presenting with for Screening Colonoscopy.    - Is this your first scope Yes? If so when was the last one n/a?  - Any complaints at the time? None  - Any family hX of Colon or Rectal Ca? None     Past Medical History  He has a past medical history of Anxiety, Arthritis (2013), Benign prostatic hyperplasia with lower urinary tract symptoms, symptom details unspecified, Bilateral pulmonary embolism (Multi) (03/15/2023), Cardiomyopathy, dilated (Multi), Depression, Diabetes mellitus (Multi), GERD (gastroesophageal reflux disease), Heparin-induced thrombocytopenia (Multi) (03/15/2023), History of repair of atrial septal defect (01/19/2024), HIT (heparin-induced thrombocytopenia) (Multi), HTN (hypertension), benign (03/15/2023), Hypercholesteremia, Hyperlipidemia (03/15/2023), Hyperlipidemia, unspecified hyperlipidemia type, Hypertension, Hypertriglyceridemia, Lumbar radicular pain, Obesity, RADHA (obstructive sleep apnea), Tachycardia (01/19/2024), and Type 2 diabetes mellitus (Multi) (09/19/2013).    Surgical History  He has a past surgical history that includes ASD repair; Biceps Tenodesis; Embolectomy (04/16/2019); Cardiac surgery (1988); and Spine surgery (2019).     Social History  He reports that he has never smoked. He has never been exposed to tobacco smoke. He has never used smokeless tobacco. He reports current alcohol use of about 3.0 standard drinks of alcohol per week. No history on file for drug use.    Family History  Family History   Problem Relation Name Age of Onset   • Hypertension Mother Nelda    • Alcohol abuse Mother Nelda    • Valvular heart disease Father Henri    • Kidney disease Father Henri    • No Known Problems Sister     • No Known Problems Brother     • No Known Problems Daughter     • No Known Problems Son     • No Known Problems Mother's Sister     • No Known Problems Mother's Brother     • No  Known Problems Father's Sister     • No Known Problems Father's Brother     • No Known Problems Maternal Grandmother     • No Known Problems Maternal Grandfather     • No Known Problems Paternal Grandmother     • No Known Problems Paternal Grandfather     • No Known Problems Other          Allergies  Heparin    Home Medications  Prior to Admission medications    Medication Sig Start Date End Date Taking? Authorizing Provider   carvedilol (Coreg) 25 mg tablet Take 2 tablets (50 mg) by mouth 2 times a day. 1/25/24 1/24/25 Yes Jasper Antony MD   gabapentin (Neurontin) 300 mg capsule TAKE 3 CAPSULES IN THE MORNING AND 3 CAPSULES BEFORE BEDTIME 3/20/24  Yes Laina Conte MD   insulin glargine (Lantus) 100 unit/mL (3 mL) pen Inject 44 Units under the skin 2 times a day. Take as directed per insulin instructions. 11/9/23 11/8/24 Yes Laina Conte MD   lisinopril 40 mg tablet Take 1 tablet (40 mg) by mouth once daily. 1/25/24 1/24/25 Yes Jasper Antony MD   metFORMIN (Glucophage) 1,000 mg tablet TAKE 1 TABLET TWICE A DAY 2/5/24  Yes Laina Conte MD   naproxen (Naprosyn) 500 mg tablet Take 1 tablet (500 mg) by mouth 2 times a day as needed (pain). 5/6/21  Yes Historical Provider, MD   pantoprazole (ProtoNix) 40 mg EC tablet TAKE 1 TABLET DAILY 6/26/24  Yes Hussain ALMONTE MD   rosuvastatin (Crestor) 20 mg tablet Take 1 tablet (20 mg) by mouth once daily. 9/21/23  Yes Brianne Meredith, APRN-ROMARIO   tamsulosin (Flomax) 0.4 mg 24 hr capsule TAKE 2 CAPSULES ONCE DAILY 5/20/24  Yes Hussain ALMONTE MD   venlafaxine XR (Effexor-XR) 150 mg 24 hr capsule Take 1 capsule (150 mg) by mouth once daily. With food 5/9/24 5/9/25 Yes Hussain ALMONTE MD   acetaminophen (Tylenol) 325 mg tablet Take 1-2 tablets (325-650 mg) by mouth every 4 hours if needed.    Historical Provider, MD   FreeStyle Leisa 2 Sensor kit USE AS DIRECTED AND CHANGE EVERY 14 DAYS. 8/31/22   Historical Provider, MD   Ozempic 2 mg/dose (8 mg/3 mL) pen  "injector INJECT 2 MG WEEKLY 2/25/24   Laina Conte MD       Review of Systems   Constitutional: Negative.    Respiratory: Negative.     Cardiovascular: Negative.    Gastrointestinal: Negative.    Genitourinary: Negative.    Skin: Negative.    Neurological: Negative.    Psychiatric/Behavioral: Negative.        Physical Exam  Constitutional:       Appearance: He is obese.   HENT:      Head: Normocephalic.   Cardiovascular:      Rate and Rhythm: Normal rate.   Pulmonary:      Effort: Pulmonary effort is normal.   Abdominal:      Palpations: Abdomen is soft.   Skin:     General: Skin is warm.   Neurological:      General: No focal deficit present.      Mental Status: He is alert and oriented to person, place, and time.   Psychiatric:         Mood and Affect: Mood normal.         Behavior: Behavior normal.      Last Recorded Vitals  Blood pressure 143/83, pulse 93, temperature 36.2 °C (97.2 °F), temperature source Temporal, resp. rate 16, height 1.956 m (6' 5\"), weight 146 kg (320 lb 12.3 oz), SpO2 97%.    Relevant Results           Assessment/Plan   Active Problems:  There are no active Hospital Problems.      -Screening colonoscopy  "

## 2024-07-18 NOTE — ANESTHESIA POSTPROCEDURE EVALUATION
Patient: Hua Saavedra    Procedure Summary       Date: 07/18/24 Room / Location: [facility] OR    Anesthesia Start: 0746 Anesthesia Stop: 0823    Procedure: COLONOSCOPY Diagnosis: Diabetic polyneuropathy associated with type 2 diabetes mellitus (Multi)    Scheduled Providers: Marko Gastelum MD Responsible Provider: Checo Carty MD    Anesthesia Type: MAC ASA Status: 3            Anesthesia Type: MAC    Vitals Value Taken Time   BP 98/49 07/18/24 0833   Temp 36.5 °C (97.7 °F) 07/18/24 0823   Pulse 92 07/18/24 0833   Resp 20 07/18/24 0833   SpO2 96 % 07/18/24 0833       Anesthesia Post Evaluation    Patient location during evaluation: bedside  Patient participation: complete - patient participated  Level of consciousness: awake  Pain management: adequate  Airway patency: patent  Cardiovascular status: acceptable  Respiratory status: acceptable  Hydration status: acceptable  Postoperative Nausea and Vomiting: none  Comments: Did well    No notable events documented.

## 2024-07-20 ENCOUNTER — HOSPITAL ENCOUNTER (OUTPATIENT)
Dept: RADIOLOGY | Facility: CLINIC | Age: 54
Discharge: HOME | End: 2024-07-20
Payer: COMMERCIAL

## 2024-07-20 ENCOUNTER — LAB (OUTPATIENT)
Dept: LAB | Facility: LAB | Age: 54
End: 2024-07-20
Payer: COMMERCIAL

## 2024-07-20 DIAGNOSIS — M25.551 BILATERAL HIP PAIN: ICD-10-CM

## 2024-07-20 DIAGNOSIS — E11.42 DIABETIC POLYNEUROPATHY ASSOCIATED WITH TYPE 2 DIABETES MELLITUS (MULTI): ICD-10-CM

## 2024-07-20 DIAGNOSIS — E11.9 TYPE 2 DIABETES MELLITUS WITHOUT COMPLICATION, WITH LONG-TERM CURRENT USE OF INSULIN (MULTI): ICD-10-CM

## 2024-07-20 DIAGNOSIS — M25.552 BILATERAL HIP PAIN: ICD-10-CM

## 2024-07-20 DIAGNOSIS — Z79.4 TYPE 2 DIABETES MELLITUS WITHOUT COMPLICATION, WITH LONG-TERM CURRENT USE OF INSULIN (MULTI): ICD-10-CM

## 2024-07-20 LAB
ALBUMIN SERPL BCP-MCNC: 4.3 G/DL (ref 3.4–5)
ALP SERPL-CCNC: 51 U/L (ref 33–120)
ALT SERPL W P-5'-P-CCNC: 16 U/L (ref 10–52)
ANION GAP SERPL CALC-SCNC: 13 MMOL/L (ref 10–20)
AST SERPL W P-5'-P-CCNC: 18 U/L (ref 9–39)
BILIRUB SERPL-MCNC: 0.5 MG/DL (ref 0–1.2)
BUN SERPL-MCNC: 26 MG/DL (ref 6–23)
CALCIUM SERPL-MCNC: 9.6 MG/DL (ref 8.6–10.6)
CHLORIDE SERPL-SCNC: 106 MMOL/L (ref 98–107)
CO2 SERPL-SCNC: 26 MMOL/L (ref 21–32)
CREAT SERPL-MCNC: 0.96 MG/DL (ref 0.5–1.3)
EGFRCR SERPLBLD CKD-EPI 2021: >90 ML/MIN/1.73M*2
EST. AVERAGE GLUCOSE BLD GHB EST-MCNC: 126 MG/DL
GLUCOSE SERPL-MCNC: 129 MG/DL (ref 74–99)
HBA1C MFR BLD: 6 %
POTASSIUM SERPL-SCNC: 5.1 MMOL/L (ref 3.5–5.3)
PROT SERPL-MCNC: 6.5 G/DL (ref 6.4–8.2)
SODIUM SERPL-SCNC: 140 MMOL/L (ref 136–145)

## 2024-07-20 PROCEDURE — 83036 HEMOGLOBIN GLYCOSYLATED A1C: CPT

## 2024-07-20 PROCEDURE — 73522 X-RAY EXAM HIPS BI 3-4 VIEWS: CPT

## 2024-07-20 PROCEDURE — 36415 COLL VENOUS BLD VENIPUNCTURE: CPT

## 2024-07-20 PROCEDURE — 73522 X-RAY EXAM HIPS BI 3-4 VIEWS: CPT | Mod: BILATERAL PROCEDURE | Performed by: RADIOLOGY

## 2024-07-20 PROCEDURE — 80053 COMPREHEN METABOLIC PANEL: CPT

## 2024-07-20 PROCEDURE — 83921 ORGANIC ACID SINGLE QUANT: CPT

## 2024-07-24 ENCOUNTER — TELEPHONE (OUTPATIENT)
Dept: PAIN MEDICINE | Facility: CLINIC | Age: 54
End: 2024-07-24
Payer: COMMERCIAL

## 2024-07-24 LAB — METHYLMALONATE SERPL-SCNC: 0.17 UMOL/L (ref 0–0.4)

## 2024-07-24 NOTE — TELEPHONE ENCOUNTER
Called patient to go over x-ray results, ;eft vm to call back  ----- Message from Sajan Garcia sent at 7/22/2024  4:15 PM EDT -----  Reviewed x-ray of his hip did not reveal any significant arthritic changes.  He may have some evidence of hip impingement, he may get a consultation orthopedics to see if it is significant.  ----- Message -----  From: Interface, Radiology Results In  Sent: 7/22/2024   8:34 AM EDT  To: Sajan Garcia MD

## 2024-08-05 DIAGNOSIS — E11.9 TYPE 2 DIABETES MELLITUS WITHOUT COMPLICATION, WITH LONG-TERM CURRENT USE OF INSULIN (MULTI): ICD-10-CM

## 2024-08-05 DIAGNOSIS — Z79.4 TYPE 2 DIABETES MELLITUS WITHOUT COMPLICATION, WITH LONG-TERM CURRENT USE OF INSULIN (MULTI): ICD-10-CM

## 2024-08-05 RX ORDER — METFORMIN HYDROCHLORIDE 1000 MG/1
1000 TABLET ORAL 2 TIMES DAILY
Qty: 180 TABLET | Refills: 3 | Status: SHIPPED | OUTPATIENT
Start: 2024-08-05

## 2024-08-06 ENCOUNTER — APPOINTMENT (OUTPATIENT)
Dept: ENDOCRINOLOGY | Facility: CLINIC | Age: 54
End: 2024-08-06
Payer: COMMERCIAL

## 2024-08-06 DIAGNOSIS — M54.16 LUMBAR RADICULAR PAIN: ICD-10-CM

## 2024-08-06 RX ORDER — GABAPENTIN 300 MG/1
CAPSULE ORAL
Qty: 540 CAPSULE | Refills: 3 | Status: SHIPPED | OUTPATIENT
Start: 2024-08-06

## 2024-08-08 ENCOUNTER — APPOINTMENT (OUTPATIENT)
Dept: CARDIOLOGY | Facility: CLINIC | Age: 54
End: 2024-08-08
Payer: COMMERCIAL

## 2024-08-08 VITALS
OXYGEN SATURATION: 95 % | HEART RATE: 93 BPM | BODY MASS INDEX: 39.13 KG/M2 | DIASTOLIC BLOOD PRESSURE: 82 MMHG | SYSTOLIC BLOOD PRESSURE: 122 MMHG | WEIGHT: 315 LBS

## 2024-08-08 DIAGNOSIS — E78.2 MIXED HYPERLIPIDEMIA: Chronic | ICD-10-CM

## 2024-08-08 DIAGNOSIS — I42.0 CARDIOMYOPATHY, DILATED (MULTI): Primary | Chronic | ICD-10-CM

## 2024-08-08 DIAGNOSIS — I10 HTN (HYPERTENSION), BENIGN: Chronic | ICD-10-CM

## 2024-08-08 DIAGNOSIS — Z87.74 HISTORY OF REPAIR OF ATRIAL SEPTAL DEFECT: Chronic | ICD-10-CM

## 2024-08-08 PROBLEM — M54.16 LUMBAR RADICULAR PAIN: Status: RESOLVED | Noted: 2023-03-15 | Resolved: 2024-08-08

## 2024-08-08 PROBLEM — R79.89 LOW TESTOSTERONE: Status: RESOLVED | Noted: 2023-03-15 | Resolved: 2024-08-08

## 2024-08-08 PROBLEM — M54.30 SCIATICA NEURALGIA: Status: RESOLVED | Noted: 2023-03-15 | Resolved: 2024-08-08

## 2024-08-08 PROBLEM — M54.9 CHRONIC BACK PAIN: Status: RESOLVED | Noted: 2023-03-15 | Resolved: 2024-08-08

## 2024-08-08 PROBLEM — G89.29 CHRONIC BACK PAIN: Status: RESOLVED | Noted: 2023-03-15 | Resolved: 2024-08-08

## 2024-08-08 PROBLEM — R30.0 DYSURIA: Status: RESOLVED | Noted: 2023-03-15 | Resolved: 2024-08-08

## 2024-08-08 PROBLEM — M47.816 LUMBAR SPONDYLOSIS: Status: RESOLVED | Noted: 2023-03-15 | Resolved: 2024-08-08

## 2024-08-08 PROBLEM — Z86.718 HISTORY OF VENOUS THROMBOEMBOLISM: Status: RESOLVED | Noted: 2023-03-15 | Resolved: 2024-08-08

## 2024-08-08 PROBLEM — R93.1 REGIONAL WALL MOTION ABNORMALITY OF HEART: Status: RESOLVED | Noted: 2023-03-15 | Resolved: 2024-08-08

## 2024-08-08 PROBLEM — M51.26 LUMBAR DISC HERNIATION: Status: RESOLVED | Noted: 2023-03-15 | Resolved: 2024-08-08

## 2024-08-08 PROBLEM — G62.9 NEUROPATHY: Status: RESOLVED | Noted: 2023-03-15 | Resolved: 2024-08-08

## 2024-08-08 PROBLEM — R20.2 PARESTHESIA OF LEFT UPPER EXTREMITY: Status: RESOLVED | Noted: 2024-01-19 | Resolved: 2024-08-08

## 2024-08-08 PROBLEM — M54.50 LOWER BACK PAIN: Status: RESOLVED | Noted: 2023-03-15 | Resolved: 2024-08-08

## 2024-08-08 PROBLEM — M25.552 LEFT HIP PAIN: Status: RESOLVED | Noted: 2023-03-15 | Resolved: 2024-08-08

## 2024-08-08 PROBLEM — M51.9 LUMBAR DISC DISEASE: Status: RESOLVED | Noted: 2023-03-15 | Resolved: 2024-08-08

## 2024-08-08 PROBLEM — R29.898 LEG WEAKNESS: Status: RESOLVED | Noted: 2023-03-15 | Resolved: 2024-08-08

## 2024-08-08 PROBLEM — M48.061 SPINAL STENOSIS, LUMBAR: Status: RESOLVED | Noted: 2023-03-15 | Resolved: 2024-08-08

## 2024-08-08 PROCEDURE — 99214 OFFICE O/P EST MOD 30 MIN: CPT | Performed by: INTERNAL MEDICINE

## 2024-08-08 PROCEDURE — 3044F HG A1C LEVEL LT 7.0%: CPT | Performed by: INTERNAL MEDICINE

## 2024-08-08 PROCEDURE — 4010F ACE/ARB THERAPY RXD/TAKEN: CPT | Performed by: INTERNAL MEDICINE

## 2024-08-08 PROCEDURE — 1036F TOBACCO NON-USER: CPT | Performed by: INTERNAL MEDICINE

## 2024-08-08 PROCEDURE — 3074F SYST BP LT 130 MM HG: CPT | Performed by: INTERNAL MEDICINE

## 2024-08-08 PROCEDURE — 3079F DIAST BP 80-89 MM HG: CPT | Performed by: INTERNAL MEDICINE

## 2024-08-08 RX ORDER — LISINOPRIL 40 MG/1
40 TABLET ORAL DAILY
Qty: 90 TABLET | Refills: 3 | Status: SHIPPED | OUTPATIENT
Start: 2024-08-08 | End: 2025-08-08

## 2024-08-08 NOTE — PATIENT INSTRUCTIONS
1. Cardiomyopathy.  Unclear etiology.  E ejection fraction 40 to 45%.  Will repeat his echo sometime in the next year.  Continue with carvedilol 50 twice daily, lisinopril 40 mg daily.  Not able to use an SGLT2 inhibitor because of a previous history of DKA.    2. Hypertension well-controlled.    3. Atrial septal defect. He had a repair in 1988. On the repeat echo, there is no evidence of leak across the ASD repair.     4. Bilateral pulmonary emboli 2019 status post catheter-based thrombectomy. This is thought to be possibly heparin-induced thrombocytopenia and/or related antibiotics. He is currently off anticoagulation. He is followed by vascular medicine. On the echo, his RV is mildly hypokinetic and slightly enlarged. This may also be related to obesity and sleep apnea.  Reassess RV function on his repeat echo     5.  Hyperlipidemia.  On rosuvastatin.  9/30/2023 LDL 60, HDL 48 triglycerides 144.  Hemoglobin A1c 6.0 these numbers are excellent.  This will be followed by his primary care doctor    Echo sometimes the next year.  Return to see me 1 year.

## 2024-08-08 NOTE — PROGRESS NOTES
hReferred by No ref. provider found    HPI 6-month follow-up.  Back is better however hips are a problem.  Still not as active.  No chest pain or pressure no shortness of breath.  Blood pressures are quite good.  Tolerated lisinopril 40 mg without difficulty.    Past Medical History:  Problem List Items Addressed This Visit    None     Past Medical History:   Diagnosis Date    Anxiety     Arthritis 2013    Benign prostatic hyperplasia with lower urinary tract symptoms, symptom details unspecified     Bilateral pulmonary embolism (Multi) 03/15/2023    s/p removal at [facility] [place] 4/2019 due to the HIT. Now off coumadin. Followed with Dr. Matthews    Cardiomyopathy, dilated (Multi)     Depression     Diabetes mellitus (Multi)     Type 2    GERD (gastroesophageal reflux disease)     Heparin-induced thrombocytopenia (Multi) 03/15/2023    4/2019 likely due to ABX. Complicated with DVT/PE    History of repair of atrial septal defect 01/19/2024    Congenital ASD repair 4/1988    HIT (heparin-induced thrombocytopenia) (Multi)     HTN (hypertension), benign 03/15/2023    Hypercholesteremia     Hyperlipidemia 03/15/2023    PCP follows    Hyperlipidemia, unspecified hyperlipidemia type     Hypertension     Hypertriglyceridemia     Lumbar radicular pain     Obesity     RADHA (obstructive sleep apnea)     Tachycardia 01/19/2024    Type 2 diabetes mellitus (Multi) 09/19/2013      Past Surgical History:  He has a past surgical history that includes ASD repair; Biceps Tenodesis; Embolectomy (04/16/2019); Cardiac surgery (1988); and Spine surgery (2019).      Social History:  He reports that he has never smoked. He has never been exposed to tobacco smoke. He has never used smokeless tobacco. He reports current alcohol use of about 3.0 standard drinks of alcohol per week. No history on file for drug use.    Family History:  Family History   Problem Relation Name Age of Onset    Hypertension Mother Nelda     Alcohol abuse Mother Nelda      Valvular heart disease Father Henri     Kidney disease Father Henri     No Known Problems Sister      No Known Problems Brother      No Known Problems Daughter      No Known Problems Son      No Known Problems Mother's Sister      No Known Problems Mother's Brother      No Known Problems Father's Sister      No Known Problems Father's Brother      No Known Problems Maternal Grandmother      No Known Problems Maternal Grandfather      No Known Problems Paternal Grandmother      No Known Problems Paternal Grandfather      No Known Problems Other       Allergies:  Heparin    Outpatient Medications:  Current Outpatient Medications   Medication Instructions    acetaminophen (Tylenol) 325 mg tablet 1-2 tablets, oral, Every 4 hours PRN    carvedilol (COREG) 50 mg, oral, 2 times daily    FreeStyle Leisa 2 Sensor kit USE AS DIRECTED AND CHANGE EVERY 14 DAYS.    gabapentin (Neurontin) 300 mg capsule TAKE 3 CAPSULES IN THE MORNING AND 3 CAPSULES BEFORE BEDTIME    insulin glargine (LANTUS) 44 Units, subcutaneous, 2 times daily, Take as directed per insulin instructions.    lisinopril 40 mg, oral, Daily    metFORMIN (GLUCOPHAGE) 1,000 mg, oral, 2 times daily    naproxen (NAPROSYN) 500 mg, oral, 2 times daily PRN    Ozempic 2 mg/dose (8 mg/3 mL) pen injector INJECT 2 MG WEEKLY    pantoprazole (PROTONIX) 40 mg, oral, Daily    rosuvastatin (CRESTOR) 20 mg, oral, Daily    tamsulosin (FLOMAX) 0.8 mg, oral, Daily    venlafaxine XR (EFFEXOR-XR) 150 mg, oral, Daily, With food     Last Recorded Vitals:  There were no vitals filed for this visit.    Physical Exam  Patient is alert and oriented x3.  HEENT is unremarkable mucous members are moist  Neck no JVP no bruits upstrokes are full no thyromegaly  Lungs are clear bilaterally.  No wheezing crackles or rales  Heart regular rhythm normal S1-S2 there is no S3 no murmurs are heard.  Abdomen is soft bs are positive nontender nondistended no organomegaly no pulsatile masses  Extremities have  no edema.  Distal pulses present palpable.  Neuro is grossly nonfocal  Skin has no rashes     Last Labs:  CBC -  Lab Results   Component Value Date    WBC 8.9 09/30/2023    HGB 13.8 09/30/2023    HCT 41.3 09/30/2023    MCV 93 09/30/2023     09/30/2023     CMP -  Lab Results   Component Value Date    CALCIUM 9.6 07/20/2024    PHOS 4.4 04/28/2019    PROT 6.5 07/20/2024    ALBUMIN 4.3 07/20/2024    AST 18 07/20/2024    ALT 16 07/20/2024    ALKPHOS 51 07/20/2024    BILITOT 0.5 07/20/2024     LIPID PANEL -   Lab Results   Component Value Date    CHOL 136 09/30/2023    HDL 47.6 09/30/2023    CHHDL 2.9 09/30/2023    VLDL 29 09/30/2023    TRIG 144 09/30/2023    NHDL 88 09/30/2023     RENAL FUNCTION PANEL -   Lab Results   Component Value Date    K 5.1 07/20/2024    PHOS 4.4 04/28/2019     Lab Results   Component Value Date    BNP 9 05/14/2022    HGBA1C 6.0 (H) 07/20/2024     Procedure  ECHO [07/20/2022]: EF 40-45%. SD = impaired relaxation pattern. Mildly reduced RVSF. LA mild-mod dial.      PHARM NST [07/20/2020]: Normal â€“ 54%     ECHO [07/20/2020]: EF 40-45%      ECHO [07/25/2019]: EF 40-45%. SD = impaired relaxation pattern. Mod reduced RVSF. Global hypokinesis of LV w/minor regional variations.      Right Heart CATH / thrombectomy [04/16/2019, Dr. Willis Anderson]: S/p aspiration thrombectomy to right pulmonary artery (multiple large clots retrieved). Status post left chest drain insertion. Ongoing management of bivalirudin and anticoagulation strategy per vascular medicine and primary team.        Assessment/Plan   1. Cardiomyopathy.  Unclear etiology.  E ejection fraction 40 to 45%.  Will repeat his echo sometime in the next year.  Continue with carvedilol 50 twice daily, lisinopril 40 mg daily.  Not able to use an SGLT2 inhibitor because of a previous history of DKA.    2. Hypertension well-controlled.    3. Atrial septal defect. He had a repair in 1988. On the repeat echo, there is no evidence of leak across the ASD  repair.     4. Bilateral pulmonary emboli 2019 status post catheter-based thrombectomy. This is thought to be possibly heparin-induced thrombocytopenia and/or related antibiotics. He is currently off anticoagulation. He is followed by vascular medicine. On the echo, his RV is mildly hypokinetic and slightly enlarged. This may also be related to obesity and sleep apnea.  Reassess RV function on his repeat echo     5.  Hyperlipidemia.  On rosuvastatin.  9/30/2023 LDL 60, HDL 48 triglycerides 144.  Hemoglobin A1c 6.0 these numbers are excellent.  This will be followed by his primary care doctor    Echo sometimes the next year.  Return to see me 1 year.    Jasper Antony MD     Instructions and follow up

## 2024-08-15 ENCOUNTER — APPOINTMENT (OUTPATIENT)
Dept: ENDOCRINOLOGY | Facility: CLINIC | Age: 54
End: 2024-08-15
Payer: COMMERCIAL

## 2024-08-15 VITALS — DIASTOLIC BLOOD PRESSURE: 80 MMHG | SYSTOLIC BLOOD PRESSURE: 135 MMHG

## 2024-08-15 DIAGNOSIS — M54.16 LUMBAR RADICULAR PAIN: ICD-10-CM

## 2024-08-15 DIAGNOSIS — Z79.4 TYPE 2 DIABETES MELLITUS WITHOUT COMPLICATION, WITH LONG-TERM CURRENT USE OF INSULIN (MULTI): Primary | ICD-10-CM

## 2024-08-15 DIAGNOSIS — E11.9 TYPE 2 DIABETES MELLITUS WITHOUT COMPLICATION, WITH LONG-TERM CURRENT USE OF INSULIN (MULTI): Primary | ICD-10-CM

## 2024-08-15 DIAGNOSIS — I10 HTN (HYPERTENSION), BENIGN: ICD-10-CM

## 2024-08-15 DIAGNOSIS — E78.00 HYPERCHOLESTEREMIA: ICD-10-CM

## 2024-08-15 PROCEDURE — 99214 OFFICE O/P EST MOD 30 MIN: CPT | Performed by: INTERNAL MEDICINE

## 2024-08-15 PROCEDURE — 1036F TOBACCO NON-USER: CPT | Performed by: INTERNAL MEDICINE

## 2024-08-15 PROCEDURE — 4010F ACE/ARB THERAPY RXD/TAKEN: CPT | Performed by: INTERNAL MEDICINE

## 2024-08-15 PROCEDURE — 3044F HG A1C LEVEL LT 7.0%: CPT | Performed by: INTERNAL MEDICINE

## 2024-08-15 PROCEDURE — 3079F DIAST BP 80-89 MM HG: CPT | Performed by: INTERNAL MEDICINE

## 2024-08-15 PROCEDURE — 3075F SYST BP GE 130 - 139MM HG: CPT | Performed by: INTERNAL MEDICINE

## 2024-08-15 RX ORDER — FLASH GLUCOSE SENSOR
KIT MISCELLANEOUS
Qty: 6 EACH | Refills: 3 | Status: SHIPPED | OUTPATIENT
Start: 2024-08-15 | End: 2024-08-16 | Stop reason: SDUPTHER

## 2024-08-15 RX ORDER — INSULIN GLARGINE 100 [IU]/ML
44 INJECTION, SOLUTION SUBCUTANEOUS 2 TIMES DAILY
Qty: 79.2 ML | Refills: 3 | Status: SHIPPED | OUTPATIENT
Start: 2024-08-15 | End: 2025-08-15

## 2024-08-15 RX ORDER — GABAPENTIN 300 MG/1
CAPSULE ORAL
Qty: 540 CAPSULE | Refills: 3 | Status: SHIPPED | OUTPATIENT
Start: 2024-08-15

## 2024-08-15 ASSESSMENT — ENCOUNTER SYMPTOMS
LIGHT-HEADEDNESS: 0
FEVER: 0
SHORTNESS OF BREATH: 0
VOMITING: 0
NAUSEA: 0
DIARRHEA: 0
DIZZINESS: 0
CHILLS: 0

## 2024-08-15 NOTE — PATIENT INSTRUCTIONS
Schedule eye exam  Keep up the good work with sugars  Call or message with concerns  Follow up in 6 months

## 2024-08-15 NOTE — PROGRESS NOTES
Endocrinology: Follow up visit  Subjective   Patient ID: Hua Saavedra is a 54 y.o. male who presents for Diabetes and Hypertension.    PCP: Hussain Haley MD    HPI  Last seen in October.  Doing great with sugars, denies lows.   Really working on eating and activity  Overdue for eye exam  Lantus 44/44+ozempic 2 mg+ metformin  Cannot take sglt2: dka hx    Review of Systems   Constitutional:  Negative for chills and fever.   Respiratory:  Negative for shortness of breath.    Gastrointestinal:  Negative for diarrhea, nausea and vomiting.   Endocrine: Negative for cold intolerance and heat intolerance.   Neurological:  Negative for dizziness and light-headedness.       Patient Active Problem List   Diagnosis    Bilateral pulmonary embolism (Multi)    BPH (benign prostatic hyperplasia)    Cardiomyopathy, dilated (Multi)    Diabetic neuropathy (Multi)    Type 2 diabetes mellitus (Multi)    Enlarged prostate    Chronic GERD    Heparin-induced thrombocytopenia (Multi)    HTN (hypertension), benign    Hyperlipidemia    Hypomagnesemia    Chronic lumbar radiculopathy    Nocturnal hypoxia    Class 2 obesity with body mass index (BMI) of 36.0 to 36.9 in adult    RADHA (obstructive sleep apnea)    Unsteady gait    Carpal tunnel syndrome of left wrist    Mixed anxiety depressive disorder    Tachycardia    History of repair of atrial septal defect        Home Meds:  Current Outpatient Medications   Medication Instructions    acetaminophen (Tylenol) 325 mg tablet 1-2 tablets, oral, Every 4 hours PRN    carvedilol (COREG) 50 mg, oral, 2 times daily    FreeStyle Leisa 2 Sensor kit USE AS DIRECTED AND CHANGE EVERY 14 DAYS.    gabapentin (Neurontin) 300 mg capsule TAKE 3 CAPSULES IN THE MORNING AND 3 CAPSULES BEFORE BEDTIME    insulin glargine (LANTUS) 44 Units, subcutaneous, 2 times daily, Take as directed per insulin instructions.    lisinopril 40 mg, oral, Daily    metFORMIN (GLUCOPHAGE) 1,000 mg, oral, 2 times daily    naproxen  "(NAPROSYN) 500 mg, oral, 2 times daily PRN    Ozempic 2 mg/dose (8 mg/3 mL) pen injector INJECT 2 MG WEEKLY    pantoprazole (PROTONIX) 40 mg, oral, Daily    rosuvastatin (CRESTOR) 20 mg, oral, Daily    tamsulosin (FLOMAX) 0.8 mg, oral, Daily    venlafaxine XR (EFFEXOR-XR) 150 mg, oral, Daily, With food        Allergies   Allergen Reactions    Heparin Other     Low platelet count        Objective   Vitals:    08/15/24 1531   BP: 135/80      There were no vitals filed for this visit.   There is no height or weight on file to calculate BMI.   Physical Exam    Labs:  Lab Results   Component Value Date    HGBA1C 6.0 (H) 07/20/2024    TSH 0.95 09/30/2023      No results found for: \"PR1\", \"THYROIDPAB\", \"TSI\"     Assessment/Plan   Assessment & Plan  Type 2 diabetes mellitus without complication, with long-term current use of insulin (Multi)    A1c is great, watch for lows advised  No change to meds  Needs to schedule eye exam  Continue gabapentin for neuropathy  HTN (hypertension), benign    Continue current med  Hypercholesteremia    Stable on statin: due for check next time  Lumbar radicular pain    Orders:    gabapentin (Neurontin) 300 mg capsule; TAKE 3 CAPSULES IN THE MORNING AND 3 CAPSULES BEFORE BEDTIME        Electronically signed by:  Laina Conte MD 08/15/24 4:06 PM              "

## 2024-08-15 NOTE — ASSESSMENT & PLAN NOTE
A1c is great, watch for lows advised  No change to meds  Needs to schedule eye exam  Continue gabapentin for neuropathy

## 2024-08-16 DIAGNOSIS — E11.9 TYPE 2 DIABETES MELLITUS WITHOUT COMPLICATION, WITH LONG-TERM CURRENT USE OF INSULIN (MULTI): ICD-10-CM

## 2024-08-16 DIAGNOSIS — Z79.4 TYPE 2 DIABETES MELLITUS WITHOUT COMPLICATION, WITH LONG-TERM CURRENT USE OF INSULIN (MULTI): ICD-10-CM

## 2024-08-16 RX ORDER — FLASH GLUCOSE SENSOR
KIT MISCELLANEOUS
Qty: 6 EACH | Refills: 3 | Status: SHIPPED | OUTPATIENT
Start: 2024-08-16

## 2024-10-14 DIAGNOSIS — E78.5 HYPERLIPIDEMIA, UNSPECIFIED HYPERLIPIDEMIA TYPE: ICD-10-CM

## 2024-10-14 RX ORDER — ROSUVASTATIN CALCIUM 20 MG/1
20 TABLET, COATED ORAL DAILY
Qty: 90 TABLET | Refills: 3 | Status: SHIPPED | OUTPATIENT
Start: 2024-10-14

## 2024-10-14 NOTE — TELEPHONE ENCOUNTER
[facility]  History and Physical     Donna [place] Patient Status:  Emergency    1999 MRN [id number]   Location [address] [facility] Attending Roshan Yip MD   Hosp Day # 0 PCP Dean Going     Chief Compla Next appt   11/13/24   Grandfather    • Allergies Sister    • Allergies Brother         Allergies:   Nuts                    ANAPHYLAXIS    Comment:Tree nuts  Peanuts                 ANAPHYLAXIS  Seasonal                    Comment:congestion    Medications:    No current facili Powder, Breath Activated Inhale 1 puff into the lungs daily. Pt's own medication.  Disp:  Rfl:    EPINEPHrine 0.3 MG/0.3ML Injection Solution Auto-injector PRN anaphylaxis Disp: 2 each Rfl: 0   VENTOLIN  (90 BASE) MCG/ACT IN AERS 2 puffs every 4 hour cellulitis  1. Empiric antibiotics  2. ID consult  3. Follow Blood Cx  2. Depression, Anxiety  1. Resume home meds  3. Asthma  1. Nebs PRN  4. UTI? 1. Lots of squamous cell and patient asymptomatic  2. Repeat UA and Cx  5. ADD  6.  Hypokalemia  1. replace

## 2024-11-11 ASSESSMENT — ENCOUNTER SYMPTOMS
PERIANAL NUMBNESS: 0
ABDOMINAL PAIN: 0
TINGLING: 1
WEIGHT LOSS: 0
BACK PAIN: 1
PARESTHESIAS: 1
PARESIS: 0
HEADACHES: 0
LEG PAIN: 1
FEVER: 0
BOWEL INCONTINENCE: 0
WEAKNESS: 1
NUMBNESS: 1
DYSURIA: 0

## 2024-11-12 ENCOUNTER — HOSPITAL ENCOUNTER (OUTPATIENT)
Dept: CARDIOLOGY | Facility: CLINIC | Age: 54
Discharge: HOME | End: 2024-11-12
Payer: COMMERCIAL

## 2024-11-12 VITALS — WEIGHT: 315 LBS | BODY MASS INDEX: 39.13 KG/M2

## 2024-11-12 DIAGNOSIS — I42.0 CARDIOMYOPATHY, DILATED (MULTI): Chronic | ICD-10-CM

## 2024-11-12 PROCEDURE — 2500000004 HC RX 250 GENERAL PHARMACY W/ HCPCS (ALT 636 FOR OP/ED): Performed by: INTERNAL MEDICINE

## 2024-11-12 PROCEDURE — 93306 TTE W/DOPPLER COMPLETE: CPT | Performed by: INTERNAL MEDICINE

## 2024-11-12 PROCEDURE — C8929 TTE W OR WO FOL WCON,DOPPLER: HCPCS

## 2024-11-13 ENCOUNTER — APPOINTMENT (OUTPATIENT)
Dept: PRIMARY CARE | Facility: CLINIC | Age: 54
End: 2024-11-13
Payer: COMMERCIAL

## 2024-11-13 VITALS
RESPIRATION RATE: 18 BRPM | SYSTOLIC BLOOD PRESSURE: 111 MMHG | OXYGEN SATURATION: 96 % | DIASTOLIC BLOOD PRESSURE: 76 MMHG | WEIGHT: 315 LBS | TEMPERATURE: 97.2 F | BODY MASS INDEX: 38.43 KG/M2 | HEART RATE: 90 BPM

## 2024-11-13 DIAGNOSIS — Z79.4 TYPE 2 DIABETES MELLITUS WITHOUT COMPLICATION, WITH LONG-TERM CURRENT USE OF INSULIN (MULTI): ICD-10-CM

## 2024-11-13 DIAGNOSIS — F32.A ANXIETY AND DEPRESSION: ICD-10-CM

## 2024-11-13 DIAGNOSIS — F41.9 ANXIETY AND DEPRESSION: ICD-10-CM

## 2024-11-13 DIAGNOSIS — H04.301 DACROCYSTITIS, RIGHT: Primary | ICD-10-CM

## 2024-11-13 DIAGNOSIS — E11.9 TYPE 2 DIABETES MELLITUS WITHOUT COMPLICATION, WITH LONG-TERM CURRENT USE OF INSULIN (MULTI): ICD-10-CM

## 2024-11-13 LAB
AORTIC VALVE MEAN GRADIENT: 4 MMHG
AORTIC VALVE PEAK VELOCITY: 1.19 M/S
AV PEAK GRADIENT: 6 MMHG
AVA (PEAK VEL): 2.77 CM2
AVA (VTI): 3 CM2
EJECTION FRACTION APICAL 4 CHAMBER: 54.4
EJECTION FRACTION: 48 %
LEFT VENTRICLE INTERNAL DIMENSION DIASTOLE: 4.46 CM (ref 3.5–6)
LEFT VENTRICULAR OUTFLOW TRACT DIAMETER: 2.17 CM
MITRAL VALVE E/A RATIO: 0.75
RIGHT VENTRICLE FREE WALL PEAK S': 17 CM/S
RIGHT VENTRICLE PEAK SYSTOLIC PRESSURE: 21.9 MMHG
TRICUSPID ANNULAR PLANE SYSTOLIC EXCURSION: 1.8 CM

## 2024-11-13 PROCEDURE — 3078F DIAST BP <80 MM HG: CPT | Performed by: FAMILY MEDICINE

## 2024-11-13 PROCEDURE — 90471 IMMUNIZATION ADMIN: CPT | Performed by: FAMILY MEDICINE

## 2024-11-13 PROCEDURE — 1036F TOBACCO NON-USER: CPT | Performed by: FAMILY MEDICINE

## 2024-11-13 PROCEDURE — 4010F ACE/ARB THERAPY RXD/TAKEN: CPT | Performed by: FAMILY MEDICINE

## 2024-11-13 PROCEDURE — 90656 IIV3 VACC NO PRSV 0.5 ML IM: CPT | Performed by: FAMILY MEDICINE

## 2024-11-13 PROCEDURE — 3074F SYST BP LT 130 MM HG: CPT | Performed by: FAMILY MEDICINE

## 2024-11-13 PROCEDURE — 99214 OFFICE O/P EST MOD 30 MIN: CPT | Performed by: FAMILY MEDICINE

## 2024-11-13 PROCEDURE — 90472 IMMUNIZATION ADMIN EACH ADD: CPT | Performed by: FAMILY MEDICINE

## 2024-11-13 PROCEDURE — 3044F HG A1C LEVEL LT 7.0%: CPT | Performed by: FAMILY MEDICINE

## 2024-11-13 PROCEDURE — 90732 PPSV23 VACC 2 YRS+ SUBQ/IM: CPT | Performed by: FAMILY MEDICINE

## 2024-11-13 RX ORDER — VENLAFAXINE HYDROCHLORIDE 150 MG/1
150 CAPSULE, EXTENDED RELEASE ORAL DAILY
Qty: 90 CAPSULE | Refills: 3 | Status: SHIPPED | OUTPATIENT
Start: 2024-11-13 | End: 2025-11-13

## 2024-11-13 RX ORDER — AMOXICILLIN AND CLAVULANATE POTASSIUM 875; 125 MG/1; MG/1
875 TABLET, FILM COATED ORAL 2 TIMES DAILY
Qty: 10 TABLET | Refills: 0 | Status: SHIPPED | OUTPATIENT
Start: 2024-11-13 | End: 2024-11-18

## 2024-11-13 RX ORDER — SEMAGLUTIDE 2.68 MG/ML
2 INJECTION, SOLUTION SUBCUTANEOUS WEEKLY
Qty: 9 ML | Refills: 3 | Status: SHIPPED | OUTPATIENT
Start: 2024-11-13

## 2024-11-13 ASSESSMENT — ENCOUNTER SYMPTOMS
LEG PAIN: 1
FEVER: 0
DYSURIA: 0
ABDOMINAL PAIN: 0
WEIGHT LOSS: 0
BOWEL INCONTINENCE: 0
PARESTHESIAS: 1
TINGLING: 1
HEADACHES: 0
PARESIS: 0
PERIANAL NUMBNESS: 0
BACK PAIN: 1
WEAKNESS: 1
NUMBNESS: 1

## 2024-11-13 ASSESSMENT — PAIN SCALES - GENERAL: PAINLEVEL_OUTOF10: 7

## 2024-11-13 NOTE — PROGRESS NOTES
Subjective   Patient ID: Hua Saavedra is a 54 y.o. male who presents for Follow-up (6 Month).    Back Pain  This is a chronic problem. The current episode started more than 1 year ago. The problem occurs constantly. The problem has been gradually worsening since onset. The pain is present in the sacro-iliac. The quality of the pain is described as stabbing. The pain radiates to the left knee and left thigh. The pain is at a severity of 7/10. The pain is Worse during the day. The symptoms are aggravated by bending, position, sitting, standing and twisting. Stiffness is present All day. Associated symptoms include leg pain, numbness, paresthesias, tingling and weakness. Pertinent negatives include no abdominal pain, bladder incontinence, bowel incontinence, chest pain, dysuria, fever, headaches, paresis, pelvic pain, perianal numbness or weight loss. Risk factors include lack of exercise, obesity and sedentary lifestyle.        Right eye discharge for 2 weeks  6 month follow up    CCM;  Insulin dependent type 2 DM  hip pain      Consultants: urology,Cardiology,Endcrinology, ophthamology    Review of Systems   Constitutional:  Negative for fever and weight loss.   Cardiovascular:  Negative for chest pain.   Gastrointestinal:  Negative for abdominal pain and bowel incontinence.   Genitourinary:  Negative for bladder incontinence, dysuria and pelvic pain.   Musculoskeletal:  Positive for back pain.   Neurological:  Positive for tingling, weakness, numbness and paresthesias. Negative for headaches.       Objective   /76 (BP Location: Left arm, Patient Position: Sitting, BP Cuff Size: Large adult)   Pulse 90   Temp 36.2 °C (97.2 °F) (Temporal)   Resp 18   Wt 147 kg (324 lb 1.6 oz)   SpO2 96%   BMI 38.43 kg/m²     Physical Exam  Vitals and nursing note reviewed.   Eyes:      General:         Right eye: Discharge present.      Comments: Tenderness and redness ?: inner canthus right eye.   Cardiovascular:       Rate and Rhythm: Normal rate and regular rhythm.   Pulmonary:      Effort: Pulmonary effort is normal.      Breath sounds: Normal breath sounds.   Musculoskeletal:      Cervical back: Neck supple.   Lymphadenopathy:      Cervical: No cervical adenopathy.   Neurological:      Mental Status: He is alert.         Assessment/Plan   Diagnoses and all orders for this visit:  Dacrocystitis, right  -     amoxicillin-pot clavulanate (Augmentin) 875-125 mg tablet; Take 1 tablet (875 mg) by mouth 2 times a day for 5 days.  Anxiety and depression  Comments:  recommended to wean off Effexor over a month.  Orders:  -     venlafaxine XR (Effexor-XR) 150 mg 24 hr capsule; Take 1 capsule (150 mg) by mouth once daily. With food  Type 2 diabetes mellitus without complication, with long-term current use of insulin (Multi)  -     semaglutide (Ozempic) 2 mg/dose (8 mg/3 mL) pen injector; Inject 2 mg under the skin 1 (one) time per week.  -     Lipid panel; Future  -     Vitamin D 25-Hydroxy,Total (for eval of Vitamin D levels); Future  -     Albumin-Creatinine Ratio, Urine Random; Future  Other orders  -     Follow Up In Primary Care - Established  -     Flu vaccine, trivalent, preservative free, age 6 months and greater (Fluarix/Fluzone/Flulaval)  -     Pneumococcal polysaccharide vaccine, 23-valent, age 2 years and older (PNEUMOVAX 23)  -     Follow Up In Primary Care - Established; Future

## 2024-11-20 ENCOUNTER — OFFICE VISIT (OUTPATIENT)
Dept: ORTHOPEDIC SURGERY | Facility: CLINIC | Age: 54
End: 2024-11-20
Payer: COMMERCIAL

## 2024-11-20 VITALS — HEIGHT: 77 IN | BODY MASS INDEX: 37.19 KG/M2 | WEIGHT: 315 LBS

## 2024-11-20 DIAGNOSIS — G89.29 CHRONIC LOW BACK PAIN WITHOUT SCIATICA, UNSPECIFIED BACK PAIN LATERALITY: Primary | ICD-10-CM

## 2024-11-20 DIAGNOSIS — M54.50 CHRONIC LOW BACK PAIN WITHOUT SCIATICA, UNSPECIFIED BACK PAIN LATERALITY: Primary | ICD-10-CM

## 2024-11-20 PROCEDURE — 4010F ACE/ARB THERAPY RXD/TAKEN: CPT | Performed by: ORTHOPAEDIC SURGERY

## 2024-11-20 PROCEDURE — 3008F BODY MASS INDEX DOCD: CPT | Performed by: ORTHOPAEDIC SURGERY

## 2024-11-20 PROCEDURE — 99213 OFFICE O/P EST LOW 20 MIN: CPT | Performed by: ORTHOPAEDIC SURGERY

## 2024-11-20 PROCEDURE — 3044F HG A1C LEVEL LT 7.0%: CPT | Performed by: ORTHOPAEDIC SURGERY

## 2024-11-20 ASSESSMENT — PAIN - FUNCTIONAL ASSESSMENT: PAIN_FUNCTIONAL_ASSESSMENT: 0-10

## 2024-11-20 NOTE — PROGRESS NOTES
Patient returns for follow-up.  When I saw him last he was having a lot more back pain and radicular symptoms.  He had some injections in the back with Dr. Garcia and that seemed to improve.  He was doing well over the summer, increasing his activity and actually lost about 15 to 18 pounds.  Unfortunately he has gained it all back, he has not been very active lately.  He is having pain in the groin and proximal thighs bilaterally.  It is bad when he walks and when he lays down at night.  It is difficult for him to put shoes and socks on and turn over in bed.    I reviewed all of his imaging studies.  MRI of the lumbar spine does demonstrate some recurrent lateral recess stenosis at L2-3.  There is also some moderate central narrowing at L3-4 and L4-5, underlying congenital spinal stenosis.    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    I suspect a good deal of his pain is coming from hip arthritis.  He has pistol- deformities bilaterally which would explain the bilateral symptoms.  I would like him to get intra-articular hip injections from both a diagnostic and hopefully therapeutic standpoint.  This will help narrow down whether or not the pain is coming from the hips or from the spine.    He is going to go back and see Dr. Garcia regarding the hip injections.  If they do not help I would be happy to see him back to discuss surgery.    *This note was dictated using speech recognition software and was not corrected for spelling or grammatical errors*

## 2024-12-05 ENCOUNTER — OFFICE VISIT (OUTPATIENT)
Dept: PAIN MEDICINE | Facility: CLINIC | Age: 54
End: 2024-12-05
Payer: COMMERCIAL

## 2024-12-05 VITALS
HEART RATE: 93 BPM | OXYGEN SATURATION: 97 % | WEIGHT: 315 LBS | BODY MASS INDEX: 37.19 KG/M2 | RESPIRATION RATE: 18 BRPM | HEIGHT: 77 IN | DIASTOLIC BLOOD PRESSURE: 80 MMHG | SYSTOLIC BLOOD PRESSURE: 127 MMHG | TEMPERATURE: 97.2 F

## 2024-12-05 DIAGNOSIS — M25.551 BILATERAL HIP PAIN: Primary | ICD-10-CM

## 2024-12-05 DIAGNOSIS — M48.062 SPINAL STENOSIS OF LUMBAR REGION WITH NEUROGENIC CLAUDICATION: ICD-10-CM

## 2024-12-05 DIAGNOSIS — M25.552 BILATERAL HIP PAIN: Primary | ICD-10-CM

## 2024-12-05 PROCEDURE — 4010F ACE/ARB THERAPY RXD/TAKEN: CPT | Performed by: ANESTHESIOLOGY

## 2024-12-05 PROCEDURE — 3044F HG A1C LEVEL LT 7.0%: CPT | Performed by: ANESTHESIOLOGY

## 2024-12-05 PROCEDURE — 99213 OFFICE O/P EST LOW 20 MIN: CPT | Performed by: ANESTHESIOLOGY

## 2024-12-05 PROCEDURE — 3074F SYST BP LT 130 MM HG: CPT | Performed by: ANESTHESIOLOGY

## 2024-12-05 PROCEDURE — 3079F DIAST BP 80-89 MM HG: CPT | Performed by: ANESTHESIOLOGY

## 2024-12-05 PROCEDURE — 3008F BODY MASS INDEX DOCD: CPT | Performed by: ANESTHESIOLOGY

## 2024-12-05 PROCEDURE — 1036F TOBACCO NON-USER: CPT | Performed by: ANESTHESIOLOGY

## 2024-12-05 ASSESSMENT — ENCOUNTER SYMPTOMS
BLOOD IN STOOL: 0
ARTHRALGIAS: 1
LOSS OF SENSATION IN FEET: 1
DEPRESSION: 0
CONSTIPATION: 0
OCCASIONAL FEELINGS OF UNSTEADINESS: 1
BACK PAIN: 1
SHORTNESS OF BREATH: 0

## 2024-12-05 ASSESSMENT — PATIENT HEALTH QUESTIONNAIRE - PHQ9
1. LITTLE INTEREST OR PLEASURE IN DOING THINGS: NOT AT ALL
2. FEELING DOWN, DEPRESSED OR HOPELESS: NOT AT ALL
SUM OF ALL RESPONSES TO PHQ9 QUESTIONS 1 AND 2: 0

## 2024-12-05 ASSESSMENT — LIFESTYLE VARIABLES: TOTAL SCORE: 4

## 2024-12-05 ASSESSMENT — PAIN SCALES - GENERAL
PAINLEVEL_OUTOF10: 6
PAINLEVEL_OUTOF10: 6

## 2024-12-05 ASSESSMENT — PAIN - FUNCTIONAL ASSESSMENT: PAIN_FUNCTIONAL_ASSESSMENT: 0-10

## 2024-12-05 ASSESSMENT — PAIN DESCRIPTION - DESCRIPTORS: DESCRIPTORS: SHARP;SHOOTING;ACHING

## 2024-12-05 NOTE — PROGRESS NOTES
Chief Complain  Follow-up (For pain in b/l hips and left leg, Currently take naproxen. Would like to discuss injections in hips)       History Of Present Illness  Hua Saavedra is a 54 y.o. male here for buttocks and hips. The patient rates the pain at 5  on a scale from 0-10.  The patient describes pain as dull, burning.  The pain is worsened by walking and is alleviated by sitting.  Since the last visit the pain has improved.  The patient denies any fever, chills, weight loss, bladder/bowel incontinence.         Past Medical History  He has a past medical history of Anxiety, Arthritis (2013), Benign prostatic hyperplasia with lower urinary tract symptoms, symptom details unspecified, Bilateral pulmonary embolism (Multi) (03/15/2023), Cardiomyopathy, dilated (Multi), Depression, Diabetes mellitus (Multi), GERD (gastroesophageal reflux disease), Heparin-induced thrombocytopenia (Multi) (03/15/2023), History of repair of atrial septal defect (01/19/2024), HIT (heparin-induced thrombocytopenia) (Multi), HTN (hypertension), benign (03/15/2023), Hypercholesteremia, Hyperlipidemia (03/15/2023), Hyperlipidemia, unspecified hyperlipidemia type, Hypertension, Hypertriglyceridemia, Lumbar radicular pain, Obesity, RADHA (obstructive sleep apnea), Tachycardia (01/19/2024), and Type 2 diabetes mellitus (09/19/2013).    Surgical History  He has a past surgical history that includes ASD repair; Biceps Tenodesis; Embolectomy (04/16/2019); Cardiac surgery (1988); and Spine surgery (2019).     Social History  He reports that he has never smoked. He has never been exposed to tobacco smoke. He has never used smokeless tobacco. He reports current alcohol use of about 3.0 standard drinks of alcohol per week. No history on file for drug use.    Family History  Family History   Problem Relation Name Age of Onset    Hypertension Mother Nelda     Alcohol abuse Mother Nelda     Valvular heart disease Father Henri     Kidney disease Father  "Henri     No Known Problems Sister      No Known Problems Brother      No Known Problems Daughter      No Known Problems Son      No Known Problems Mother's Sister      No Known Problems Mother's Brother      No Known Problems Father's Sister      No Known Problems Father's Brother      No Known Problems Maternal Grandmother      No Known Problems Maternal Grandfather      No Known Problems Paternal Grandmother      No Known Problems Paternal Grandfather      No Known Problems Other          Allergies  Heparin    Review of Systems  Review of Systems   Respiratory:  Negative for shortness of breath.    Cardiovascular:  Negative for chest pain.   Gastrointestinal:  Negative for blood in stool and constipation.   Musculoskeletal:  Positive for arthralgias and back pain.   Psychiatric/Behavioral:  Negative for suicidal ideas.         Physical Exam  Physical Exam  HENT:      Head: Normocephalic and atraumatic.   Eyes:      Extraocular Movements: Extraocular movements intact.      Pupils: Pupils are equal, round, and reactive to light.   Pulmonary:      Effort: Pulmonary effort is normal.   Musculoskeletal:      Cervical back: Neck supple.   Neurological:      Mental Status: He is alert.   Psychiatric:         Mood and Affect: Mood normal.           Last Recorded Vitals  Blood pressure 127/80, pulse 93, temperature 36.2 °C (97.2 °F), resp. rate 18, height 1.956 m (6' 5\"), weight 143 kg (315 lb), SpO2 97%.    Reviewed Images  Reviewed and independently interpreted MRI Lumbar spine multiple level spinal canal stenosis including at L3-4 in setting of congenital narrow spinal canal           Assessment/Plan     Hua Saavedra is a 54 y.o. male here for follow-up of chronic bilateral, lateral thigh and groin pain.  Does have multiple level spinal canal stenosis worst being at L3-4.  He is status post L3-4 epidural steroid injection which provided him with significant relief of pain lasting for several weeks now.  He was " evaluated by Dr. Quiroga who suspect he may have pistol- deformity which may be source of his pain.  His pain is located in the buttock, groin area.  Would trial him on diagnostic and therapeutic bilateral hip injection as next step in managing his pain.  If he does get significant short-term benefit would refer him to orthopedics for consideration of surgical interventions if indicated.          Sajan Garcia MD

## 2024-12-10 DIAGNOSIS — E11.9 TYPE 2 DIABETES MELLITUS WITHOUT COMPLICATION, WITH LONG-TERM CURRENT USE OF INSULIN (MULTI): Primary | ICD-10-CM

## 2024-12-10 DIAGNOSIS — Z79.4 TYPE 2 DIABETES MELLITUS WITHOUT COMPLICATION, WITH LONG-TERM CURRENT USE OF INSULIN (MULTI): Primary | ICD-10-CM

## 2024-12-10 RX ORDER — TIRZEPATIDE 5 MG/.5ML
5 INJECTION, SOLUTION SUBCUTANEOUS
Qty: 2 ML | Refills: 0 | Status: SHIPPED | OUTPATIENT
Start: 2024-12-10

## 2024-12-20 ENCOUNTER — HOSPITAL ENCOUNTER (OUTPATIENT)
Dept: PAIN MEDICINE | Facility: CLINIC | Age: 54
Discharge: HOME | End: 2024-12-20
Payer: COMMERCIAL

## 2024-12-20 VITALS
RESPIRATION RATE: 18 BRPM | BODY MASS INDEX: 37.19 KG/M2 | HEIGHT: 77 IN | OXYGEN SATURATION: 98 % | TEMPERATURE: 96.8 F | SYSTOLIC BLOOD PRESSURE: 128 MMHG | DIASTOLIC BLOOD PRESSURE: 60 MMHG | WEIGHT: 315 LBS | HEART RATE: 103 BPM

## 2024-12-20 DIAGNOSIS — M25.551 BILATERAL HIP PAIN: ICD-10-CM

## 2024-12-20 DIAGNOSIS — M25.552 BILATERAL HIP PAIN: ICD-10-CM

## 2024-12-20 PROCEDURE — 2500000004 HC RX 250 GENERAL PHARMACY W/ HCPCS (ALT 636 FOR OP/ED): Performed by: ANESTHESIOLOGY

## 2024-12-20 PROCEDURE — 77002 NEEDLE LOCALIZATION BY XRAY: CPT | Mod: 50 | Performed by: ANESTHESIOLOGY

## 2024-12-20 PROCEDURE — 20610 DRAIN/INJ JOINT/BURSA W/O US: CPT | Performed by: ANESTHESIOLOGY

## 2024-12-20 PROCEDURE — 2550000001 HC RX 255 CONTRASTS: Performed by: ANESTHESIOLOGY

## 2024-12-20 RX ORDER — ROPIVACAINE HYDROCHLORIDE 5 MG/ML
INJECTION, SOLUTION EPIDURAL; INFILTRATION; PERINEURAL AS NEEDED
Status: COMPLETED | OUTPATIENT
Start: 2024-12-20 | End: 2024-12-20

## 2024-12-20 RX ORDER — TRIAMCINOLONE ACETONIDE 40 MG/ML
INJECTION, SUSPENSION INTRA-ARTICULAR; INTRAMUSCULAR AS NEEDED
Status: COMPLETED | OUTPATIENT
Start: 2024-12-20 | End: 2024-12-20

## 2024-12-20 RX ORDER — LIDOCAINE HYDROCHLORIDE 10 MG/ML
INJECTION, SOLUTION EPIDURAL; INFILTRATION; INTRACAUDAL; PERINEURAL AS NEEDED
Status: COMPLETED | OUTPATIENT
Start: 2024-12-20 | End: 2024-12-20

## 2024-12-20 ASSESSMENT — PAIN DESCRIPTION - DESCRIPTORS: DESCRIPTORS: SHARP

## 2024-12-20 ASSESSMENT — ENCOUNTER SYMPTOMS
OCCASIONAL FEELINGS OF UNSTEADINESS: 1
DEPRESSION: 0
LOSS OF SENSATION IN FEET: 1

## 2024-12-20 ASSESSMENT — PAIN - FUNCTIONAL ASSESSMENT: PAIN_FUNCTIONAL_ASSESSMENT: 0-10

## 2024-12-20 ASSESSMENT — PAIN SCALES - GENERAL: PAINLEVEL_OUTOF10: 7

## 2024-12-30 DIAGNOSIS — Z79.4 TYPE 2 DIABETES MELLITUS WITHOUT COMPLICATION, WITH LONG-TERM CURRENT USE OF INSULIN (MULTI): Primary | ICD-10-CM

## 2024-12-30 DIAGNOSIS — E11.9 TYPE 2 DIABETES MELLITUS WITHOUT COMPLICATION, WITH LONG-TERM CURRENT USE OF INSULIN (MULTI): Primary | ICD-10-CM

## 2024-12-30 RX ORDER — TIRZEPATIDE 7.5 MG/.5ML
7.5 INJECTION, SOLUTION SUBCUTANEOUS
Qty: 2 ML | Refills: 0 | Status: SHIPPED | OUTPATIENT
Start: 2024-12-30

## 2025-01-29 DIAGNOSIS — E11.9 TYPE 2 DIABETES MELLITUS WITHOUT COMPLICATION, WITH LONG-TERM CURRENT USE OF INSULIN (MULTI): Primary | ICD-10-CM

## 2025-01-29 DIAGNOSIS — Z79.4 TYPE 2 DIABETES MELLITUS WITHOUT COMPLICATION, WITH LONG-TERM CURRENT USE OF INSULIN (MULTI): Primary | ICD-10-CM

## 2025-01-29 RX ORDER — TIRZEPATIDE 10 MG/.5ML
10 INJECTION, SOLUTION SUBCUTANEOUS
Qty: 2 ML | Refills: 0 | Status: SHIPPED | OUTPATIENT
Start: 2025-02-02 | End: 2026-02-02

## 2025-02-02 DIAGNOSIS — I10 HTN (HYPERTENSION), BENIGN: Chronic | ICD-10-CM

## 2025-02-04 RX ORDER — CARVEDILOL 25 MG/1
50 TABLET ORAL 2 TIMES DAILY
Qty: 360 TABLET | Refills: 3 | Status: SHIPPED | OUTPATIENT
Start: 2025-02-04

## 2025-02-10 ENCOUNTER — APPOINTMENT (OUTPATIENT)
Dept: PAIN MEDICINE | Facility: CLINIC | Age: 55
End: 2025-02-10
Payer: COMMERCIAL

## 2025-02-10 VITALS
TEMPERATURE: 95.9 F | HEIGHT: 77 IN | WEIGHT: 310 LBS | OXYGEN SATURATION: 97 % | HEART RATE: 97 BPM | SYSTOLIC BLOOD PRESSURE: 112 MMHG | BODY MASS INDEX: 36.6 KG/M2 | DIASTOLIC BLOOD PRESSURE: 75 MMHG | RESPIRATION RATE: 10 BRPM

## 2025-02-10 DIAGNOSIS — M48.062 SPINAL STENOSIS OF LUMBAR REGION WITH NEUROGENIC CLAUDICATION: Primary | ICD-10-CM

## 2025-02-10 PROCEDURE — 4010F ACE/ARB THERAPY RXD/TAKEN: CPT | Performed by: ANESTHESIOLOGY

## 2025-02-10 PROCEDURE — 1036F TOBACCO NON-USER: CPT | Performed by: ANESTHESIOLOGY

## 2025-02-10 PROCEDURE — 99213 OFFICE O/P EST LOW 20 MIN: CPT | Performed by: ANESTHESIOLOGY

## 2025-02-10 PROCEDURE — 3074F SYST BP LT 130 MM HG: CPT | Performed by: ANESTHESIOLOGY

## 2025-02-10 PROCEDURE — 3008F BODY MASS INDEX DOCD: CPT | Performed by: ANESTHESIOLOGY

## 2025-02-10 PROCEDURE — 3078F DIAST BP <80 MM HG: CPT | Performed by: ANESTHESIOLOGY

## 2025-02-10 RX ORDER — CELECOXIB 200 MG/1
200 CAPSULE ORAL DAILY
Qty: 30 CAPSULE | Refills: 0 | Status: SHIPPED | OUTPATIENT
Start: 2025-02-10 | End: 2025-03-12

## 2025-02-10 ASSESSMENT — ENCOUNTER SYMPTOMS
ARTHRALGIAS: 1
OCCASIONAL FEELINGS OF UNSTEADINESS: 1
CONSTIPATION: 0
SHORTNESS OF BREATH: 0
FEVER: 0
DEPRESSION: 0
BACK PAIN: 1
LOSS OF SENSATION IN FEET: 1
BLOOD IN STOOL: 0
ADENOPATHY: 0

## 2025-02-10 ASSESSMENT — PATIENT HEALTH QUESTIONNAIRE - PHQ9
2. FEELING DOWN, DEPRESSED OR HOPELESS: NOT AT ALL
1. LITTLE INTEREST OR PLEASURE IN DOING THINGS: NOT AT ALL
SUM OF ALL RESPONSES TO PHQ9 QUESTIONS 1 & 2: 0

## 2025-02-10 ASSESSMENT — PAIN - FUNCTIONAL ASSESSMENT: PAIN_FUNCTIONAL_ASSESSMENT: 0-10

## 2025-02-10 ASSESSMENT — PAIN SCALES - GENERAL
PAINLEVEL_OUTOF10: 5
PAINLEVEL_OUTOF10: 5 - MODERATE PAIN

## 2025-02-10 ASSESSMENT — PAIN DESCRIPTION - DESCRIPTORS: DESCRIPTORS: ACHING;STABBING

## 2025-02-10 NOTE — PROGRESS NOTES
Chief Complain  Follow-up (12/20/24 inj follow up. Stated received 30% for a few weeks in LT thigh. Pain level is 5 in LT thigh. Take naproxen. No physical therapy or recent radiology tests.)       History Of Present Illness  Hua Saavedra is a 54 y.o. male here for buttocks and hips. The patient rates the pain at 8 on a scale from 0-10.  The patient describes pain as aching and sharp.  The pain is worsened by no aggravating factors identified and is alleviated by nothing relieves the pain.  Since the last visit the pain has stayed the same.  The patient denies any fever, chills, weight loss, bladder/bowel incontinence.         Past Medical History  He has a past medical history of Anxiety, Arthritis (2013), Benign prostatic hyperplasia with lower urinary tract symptoms, symptom details unspecified, Bilateral pulmonary embolism (Multi) (03/15/2023), Cardiomyopathy, dilated (Multi), Depression, Diabetes mellitus (Multi), GERD (gastroesophageal reflux disease), Heparin-induced thrombocytopenia (Multi) (03/15/2023), History of repair of atrial septal defect (01/19/2024), HIT (heparin-induced thrombocytopenia) (Multi), HTN (hypertension), benign (03/15/2023), Hypercholesteremia, Hyperlipidemia (03/15/2023), Hyperlipidemia, unspecified hyperlipidemia type, Hypertension, Hypertriglyceridemia, Lumbar radicular pain, Obesity, RADHA (obstructive sleep apnea), Tachycardia (01/19/2024), and Type 2 diabetes mellitus (09/19/2013).    Surgical History  He has a past surgical history that includes ASD repair; Biceps Tenodesis; Embolectomy (04/16/2019); Cardiac surgery (1988); and Spine surgery (2019).     Social History  He reports that he has never smoked. He has never been exposed to tobacco smoke. He has never used smokeless tobacco. He reports current alcohol use of about 3.0 standard drinks of alcohol per week. He reports that he does not currently use drugs.    Family History  Family History   Problem Relation Name Age of  "Onset    Hypertension Mother Nelda     Alcohol abuse Mother Nelda     Valvular heart disease Father Henri     Kidney disease Father Henri     No Known Problems Sister      No Known Problems Brother      No Known Problems Daughter      No Known Problems Son      No Known Problems Mother's Sister      No Known Problems Mother's Brother      No Known Problems Father's Sister      No Known Problems Father's Brother      No Known Problems Maternal Grandmother      No Known Problems Maternal Grandfather      No Known Problems Paternal Grandmother      No Known Problems Paternal Grandfather      No Known Problems Other          Allergies  Heparin    Review of Systems  Review of Systems   Constitutional:  Negative for fever.   Respiratory:  Negative for shortness of breath.    Cardiovascular:  Negative for chest pain.   Gastrointestinal:  Negative for blood in stool and constipation.   Musculoskeletal:  Positive for arthralgias and back pain.   Hematological:  Negative for adenopathy.   Psychiatric/Behavioral:  Negative for suicidal ideas.         Physical Exam  Physical Exam  HENT:      Head: Normocephalic and atraumatic.   Eyes:      Extraocular Movements: Extraocular movements intact.      Pupils: Pupils are equal, round, and reactive to light.   Pulmonary:      Effort: Pulmonary effort is normal.   Musculoskeletal:      Cervical back: Neck supple.   Neurological:      Mental Status: He is alert.   Psychiatric:         Mood and Affect: Mood normal.         Last Recorded Vitals  Blood pressure 112/75, pulse 97, temperature 35.5 °C (95.9 °F), temperature source Temporal, resp. rate 10, height 1.956 m (6' 5\"), weight 141 kg (310 lb), SpO2 97%.    Reviewed Images  Reviewed and independently interpreted MRI Lumbar spine multiple level spinal canal stenosis including at L3-4 in setting of congenital narrow spinal canal           Assessment/Plan     Hua Saavedra is a 54 y.o. male here for follow-up of chronic bilateral, " lateral thigh and groin pain.  Does have multiple level spinal canal stenosis worst being at L3-4.  He is status post L3-4 epidural steroid injection which provided him with significant relief of pain lasting for several weeks now.  Last visit we did bilateral hip joint injection which did not provide him with any significant relief as far as his buttock pain is concerned.  He did had some pain relief in his thighs.  Given the results of these it is unlikely that hips are contributing majorly to his pain.  He has not done physical therapy for last few years, I would send him for physical therapy.  Trial him on Celebrex in place of naproxen.  Risk benefits and alternatives were discussed.  Follow-up as needed.        Sajan Garcia MD

## 2025-02-27 DIAGNOSIS — E11.9 TYPE 2 DIABETES MELLITUS WITHOUT COMPLICATION, WITH LONG-TERM CURRENT USE OF INSULIN (MULTI): Primary | ICD-10-CM

## 2025-02-27 DIAGNOSIS — Z79.4 TYPE 2 DIABETES MELLITUS WITHOUT COMPLICATION, WITH LONG-TERM CURRENT USE OF INSULIN (MULTI): Primary | ICD-10-CM

## 2025-02-27 RX ORDER — TIRZEPATIDE 12.5 MG/.5ML
12.5 INJECTION, SOLUTION SUBCUTANEOUS
Qty: 2 ML | Refills: 0 | Status: SHIPPED | OUTPATIENT
Start: 2025-02-27

## 2025-03-09 DIAGNOSIS — M48.062 SPINAL STENOSIS OF LUMBAR REGION WITH NEUROGENIC CLAUDICATION: ICD-10-CM

## 2025-03-10 RX ORDER — CELECOXIB 200 MG/1
CAPSULE ORAL
Qty: 30 CAPSULE | Refills: 0 | OUTPATIENT
Start: 2025-03-10

## 2025-03-10 RX ORDER — CELECOXIB 200 MG/1
200 CAPSULE ORAL DAILY
Qty: 90 CAPSULE | Refills: 0 | Status: SHIPPED | OUTPATIENT
Start: 2025-03-10

## 2025-03-31 DIAGNOSIS — Z79.4 TYPE 2 DIABETES MELLITUS WITHOUT COMPLICATION, WITH LONG-TERM CURRENT USE OF INSULIN: Primary | ICD-10-CM

## 2025-03-31 DIAGNOSIS — E11.9 TYPE 2 DIABETES MELLITUS WITHOUT COMPLICATION, WITH LONG-TERM CURRENT USE OF INSULIN: Primary | ICD-10-CM

## 2025-05-04 DIAGNOSIS — E11.9 TYPE 2 DIABETES MELLITUS WITHOUT COMPLICATION, WITH LONG-TERM CURRENT USE OF INSULIN: Primary | ICD-10-CM

## 2025-05-04 DIAGNOSIS — Z79.4 TYPE 2 DIABETES MELLITUS WITHOUT COMPLICATION, WITH LONG-TERM CURRENT USE OF INSULIN: Primary | ICD-10-CM

## 2025-05-11 LAB
25(OH)D3+25(OH)D2 SERPL-MCNC: 32 NG/ML (ref 30–100)
ALBUMIN SERPL-MCNC: 4.5 G/DL (ref 3.6–5.1)
ALP SERPL-CCNC: 39 U/L (ref 35–144)
ALT SERPL-CCNC: 16 U/L (ref 9–46)
ANION GAP SERPL CALCULATED.4IONS-SCNC: 8 MMOL/L (CALC) (ref 7–17)
AST SERPL-CCNC: 22 U/L (ref 10–35)
BILIRUB SERPL-MCNC: 0.5 MG/DL (ref 0.2–1.2)
BUN SERPL-MCNC: 26 MG/DL (ref 7–25)
CALCIUM SERPL-MCNC: 9.1 MG/DL (ref 8.6–10.3)
CHLORIDE SERPL-SCNC: 106 MMOL/L (ref 98–110)
CHOLEST SERPL-MCNC: 123 MG/DL
CHOLEST/HDLC SERPL: 3.3 (CALC)
CO2 SERPL-SCNC: 23 MMOL/L (ref 20–32)
CREAT SERPL-MCNC: 1.01 MG/DL (ref 0.7–1.3)
EGFRCR SERPLBLD CKD-EPI 2021: 88 ML/MIN/1.73M2
ERYTHROCYTE [DISTWIDTH] IN BLOOD BY AUTOMATED COUNT: 12.1 % (ref 11–15)
EST. AVERAGE GLUCOSE BLD GHB EST-MCNC: 120 MG/DL
EST. AVERAGE GLUCOSE BLD GHB EST-SCNC: 6.6 MMOL/L
GLUCOSE SERPL-MCNC: 132 MG/DL (ref 65–99)
HBA1C MFR BLD: 5.8 %
HCT VFR BLD AUTO: 40.5 % (ref 38.5–50)
HDLC SERPL-MCNC: 37 MG/DL
HGB BLD-MCNC: 13.4 G/DL (ref 13.2–17.1)
LDLC SERPL CALC-MCNC: 59 MG/DL (CALC)
MCH RBC QN AUTO: 31.5 PG (ref 27–33)
MCHC RBC AUTO-ENTMCNC: 33.1 G/DL (ref 32–36)
MCV RBC AUTO: 95.3 FL (ref 80–100)
NONHDLC SERPL-MCNC: 86 MG/DL (CALC)
PLATELET # BLD AUTO: 163 THOUSAND/UL (ref 140–400)
PMV BLD REES-ECKER: 10.6 FL (ref 7.5–12.5)
POTASSIUM SERPL-SCNC: 5.1 MMOL/L (ref 3.5–5.3)
PROT SERPL-MCNC: 6.6 G/DL (ref 6.1–8.1)
RBC # BLD AUTO: 4.25 MILLION/UL (ref 4.2–5.8)
SODIUM SERPL-SCNC: 137 MMOL/L (ref 135–146)
TRIGL SERPL-MCNC: 209 MG/DL
WBC # BLD AUTO: 6.1 THOUSAND/UL (ref 3.8–10.8)

## 2025-05-14 ENCOUNTER — APPOINTMENT (OUTPATIENT)
Dept: PRIMARY CARE | Facility: CLINIC | Age: 55
End: 2025-05-14
Payer: COMMERCIAL

## 2025-05-14 VITALS
OXYGEN SATURATION: 96 % | TEMPERATURE: 97.3 F | BODY MASS INDEX: 37.59 KG/M2 | HEART RATE: 91 BPM | WEIGHT: 315 LBS | SYSTOLIC BLOOD PRESSURE: 101 MMHG | RESPIRATION RATE: 18 BRPM | DIASTOLIC BLOOD PRESSURE: 72 MMHG

## 2025-05-14 DIAGNOSIS — D75.829 HEPARIN-INDUCED THROMBOCYTOPENIA (MULTI): ICD-10-CM

## 2025-05-14 DIAGNOSIS — Z12.5 ENCOUNTER FOR SCREENING FOR MALIGNANT NEOPLASM OF PROSTATE: Primary | ICD-10-CM

## 2025-05-14 DIAGNOSIS — I42.0 CARDIOMYOPATHY, DILATED (MULTI): ICD-10-CM

## 2025-05-14 DIAGNOSIS — E11.42 DIABETIC POLYNEUROPATHY ASSOCIATED WITH TYPE 2 DIABETES MELLITUS: ICD-10-CM

## 2025-05-14 DIAGNOSIS — M48.062 SPINAL STENOSIS OF LUMBAR REGION WITH NEUROGENIC CLAUDICATION: ICD-10-CM

## 2025-05-14 PROBLEM — N40.0 ENLARGED PROSTATE: Status: RESOLVED | Noted: 2023-03-15 | Resolved: 2025-05-14

## 2025-05-14 PROBLEM — G47.34 NOCTURNAL HYPOXIA: Status: RESOLVED | Noted: 2023-03-15 | Resolved: 2025-05-14

## 2025-05-14 PROCEDURE — 3078F DIAST BP <80 MM HG: CPT | Performed by: FAMILY MEDICINE

## 2025-05-14 PROCEDURE — 1036F TOBACCO NON-USER: CPT | Performed by: FAMILY MEDICINE

## 2025-05-14 PROCEDURE — 4010F ACE/ARB THERAPY RXD/TAKEN: CPT | Performed by: FAMILY MEDICINE

## 2025-05-14 PROCEDURE — 99214 OFFICE O/P EST MOD 30 MIN: CPT | Performed by: FAMILY MEDICINE

## 2025-05-14 PROCEDURE — 3074F SYST BP LT 130 MM HG: CPT | Performed by: FAMILY MEDICINE

## 2025-05-14 RX ORDER — CELECOXIB 200 MG/1
200 CAPSULE ORAL DAILY
Qty: 90 CAPSULE | Refills: 0 | Status: SHIPPED | OUTPATIENT
Start: 2025-05-14

## 2025-05-14 ASSESSMENT — PAIN SCALES - GENERAL: PAINLEVEL_OUTOF10: 5

## 2025-05-14 ASSESSMENT — PATIENT HEALTH QUESTIONNAIRE - PHQ9
SUM OF ALL RESPONSES TO PHQ9 QUESTIONS 1 AND 2: 0
1. LITTLE INTEREST OR PLEASURE IN DOING THINGS: NOT AT ALL
2. FEELING DOWN, DEPRESSED OR HOPELESS: NOT AT ALL

## 2025-05-14 NOTE — PROGRESS NOTES
Subjective   Patient ID: Hua Saavedra is a 54 y.o. male who presents for 6 Follow-up.    HPI     6 month follow up.    HbA1c : prediabetic range, better than it has ever been    Lumbar sprain from walking without cane.    To start PT for back and hip pain: after returning from vacation.      Review of Systems   All other systems reviewed and are negative.      Objective   /72 (BP Location: Left arm, Patient Position: Sitting, BP Cuff Size: Large adult)   Pulse 91   Temp 36.3 °C (97.3 °F) (Temporal)   Resp 18   Wt 144 kg (317 lb)   SpO2 96%   BMI 37.59 kg/m²     Physical Exam  Vitals and nursing note reviewed.   Cardiovascular:      Rate and Rhythm: Normal rate and regular rhythm.   Pulmonary:      Effort: Pulmonary effort is normal.      Breath sounds: Normal breath sounds.   Musculoskeletal:      Cervical back: Neck supple.      Lumbar back: Normal.   Lymphadenopathy:      Cervical: No cervical adenopathy.   Neurological:      Mental Status: He is alert.   Psychiatric:         Mood and Affect: Mood normal.         Behavior: Behavior normal.         Thought Content: Thought content normal.         Judgment: Judgment normal.         Assessment/Plan   Diagnoses and all orders for this visit:  Encounter for screening for malignant neoplasm of prostate  -     PSA; Future  Cardiomyopathy, dilated (Multi)  Heparin-induced thrombocytopenia (Multi)  Diabetic polyneuropathy associated with type 2 diabetes mellitus  -     Albumin-Creatinine Ratio, Urine Random; Future  Spinal stenosis of lumbar region with neurogenic claudication  -     celecoxib (CeleBREX) 200 mg capsule; Take 1 capsule (200 mg) by mouth once daily.  Other orders  -     Follow Up In Primary Care - Established  -     Follow Up In Primary Care - Established; Future

## 2025-05-15 ENCOUNTER — APPOINTMENT (OUTPATIENT)
Dept: ENDOCRINOLOGY | Facility: CLINIC | Age: 55
End: 2025-05-15
Payer: COMMERCIAL

## 2025-05-15 VITALS
BODY MASS INDEX: 37.05 KG/M2 | WEIGHT: 313.8 LBS | HEART RATE: 97 BPM | RESPIRATION RATE: 16 BRPM | HEIGHT: 77 IN | DIASTOLIC BLOOD PRESSURE: 76 MMHG | SYSTOLIC BLOOD PRESSURE: 120 MMHG

## 2025-05-15 DIAGNOSIS — E11.9 TYPE 2 DIABETES MELLITUS WITHOUT COMPLICATION, WITH LONG-TERM CURRENT USE OF INSULIN: Primary | ICD-10-CM

## 2025-05-15 DIAGNOSIS — E78.00 HYPERCHOLESTEREMIA: ICD-10-CM

## 2025-05-15 DIAGNOSIS — Z79.4 TYPE 2 DIABETES MELLITUS WITHOUT COMPLICATION, WITH LONG-TERM CURRENT USE OF INSULIN: Primary | ICD-10-CM

## 2025-05-15 DIAGNOSIS — I10 HTN (HYPERTENSION), BENIGN: ICD-10-CM

## 2025-05-15 LAB
ALBUMIN/CREAT UR: 2 MG/G CREAT
CREAT UR-MCNC: 167 MG/DL (ref 20–320)
MICROALBUMIN UR-MCNC: 0.3 MG/DL

## 2025-05-15 PROCEDURE — 99214 OFFICE O/P EST MOD 30 MIN: CPT | Performed by: INTERNAL MEDICINE

## 2025-05-15 PROCEDURE — 1036F TOBACCO NON-USER: CPT | Performed by: INTERNAL MEDICINE

## 2025-05-15 PROCEDURE — 3008F BODY MASS INDEX DOCD: CPT | Performed by: INTERNAL MEDICINE

## 2025-05-15 PROCEDURE — 3078F DIAST BP <80 MM HG: CPT | Performed by: INTERNAL MEDICINE

## 2025-05-15 PROCEDURE — 4010F ACE/ARB THERAPY RXD/TAKEN: CPT | Performed by: INTERNAL MEDICINE

## 2025-05-15 PROCEDURE — 3074F SYST BP LT 130 MM HG: CPT | Performed by: INTERNAL MEDICINE

## 2025-05-15 RX ORDER — BLOOD-GLUCOSE SENSOR
EACH MISCELLANEOUS
Qty: 6 EACH | Refills: 3 | Status: SHIPPED | OUTPATIENT
Start: 2025-05-15

## 2025-05-15 ASSESSMENT — ENCOUNTER SYMPTOMS
DIARRHEA: 0
LIGHT-HEADEDNESS: 0
NAUSEA: 0
CHILLS: 0
VOMITING: 0
FEVER: 0
DIZZINESS: 0
SHORTNESS OF BREATH: 0

## 2025-05-15 NOTE — PROGRESS NOTES
Endocrinology: Follow up visit  Subjective   Patient ID: Hua Saavedra is a 54 y.o. male who presents for Diabetes (Type 2 ), Hyperlipidemia, and Hypertension.    PCP: Hussain Haley MD    [facility] [place]  Last seen 8/2024  Dm2  Lantus 44/44  Mounjaro 15 mg  Metformin 2000 every day  Cannot take sglt2: hx dka    Doing great since last visit  Moved to mounjaro and doing fine with it  Weight down a few lbs  Sugars are great  No lows    Checks sugars 4x a day  Injects insulin 2x a day  Currently utilizing cgm to check sugars      Review of Systems   Constitutional:  Negative for chills and fever.   Respiratory:  Negative for shortness of breath.    Gastrointestinal:  Negative for diarrhea, nausea and vomiting.   Endocrine: Negative for cold intolerance and heat intolerance.   Neurological:  Negative for dizziness and light-headedness.       Problem List[1]     Home Meds:  Current Outpatient Medications   Medication Instructions    acetaminophen (Tylenol) 325 mg tablet 1-2 tablets, Every 4 hours PRN    blood-glucose sensor (FREESTYLE JUAREZ 2 PLUS SENSOR MISC) No dose, route, or frequency recorded.    carvedilol (COREG) 50 mg, oral, 2 times daily    celecoxib (CELEBREX) 200 mg, oral, Daily    FreeStyle Juarez 2 Sensor kit Change every 14 days    gabapentin (Neurontin) 300 mg capsule TAKE 3 CAPSULES IN THE MORNING AND 3 CAPSULES BEFORE BEDTIME    insulin glargine (LANTUS) 44 Units, subcutaneous, 2 times daily, Take as directed per insulin instructions.    lisinopril 40 mg, oral, Daily    metFORMIN (GLUCOPHAGE) 1,000 mg, oral, 2 times daily    pantoprazole (PROTONIX) 40 mg, oral, Daily    rosuvastatin (CRESTOR) 20 mg, oral, Daily    tamsulosin (FLOMAX) 0.8 mg, oral, Daily    tirzepatide 15 mg, subcutaneous, Every 7 days    venlafaxine XR (EFFEXOR-XR) 150 mg, oral, Daily, With food        RX Allergies[2]     Objective   Vitals:    05/15/25 1602   BP: 120/76   Pulse: 97   Resp: 16      Vitals:    05/15/25 1602   Weight: 142 kg (313  "lb 12.8 oz)      Body mass index is 37.21 kg/m².   Physical Exam  Constitutional:       Appearance: Normal appearance. He is overweight.   HENT:      Head: Normocephalic and atraumatic.   Neck:      Thyroid: No thyroid mass, thyromegaly or thyroid tenderness.   Cardiovascular:      Rate and Rhythm: Normal rate and regular rhythm.      Heart sounds: No murmur heard.     No gallop.   Pulmonary:      Effort: Pulmonary effort is normal.      Breath sounds: Normal breath sounds.   Abdominal:      Palpations: Abdomen is soft.      Comments: benign   Neurological:      General: No focal deficit present.      Mental Status: He is alert and oriented to person, place, and time.      Deep Tendon Reflexes: Reflexes are normal and symmetric.   Psychiatric:         Behavior: Behavior is cooperative.         Labs:  Lab Results   Component Value Date    HGBA1C 5.8 (H) 05/10/2025    TSH 0.95 09/30/2023      No results found for: \"PR1\", \"THYROIDPAB\", \"TSI\"     Assessment/Plan   Assessment & Plan  Type 2 diabetes mellitus without complication, with long-term current use of insulin    A1c is excellent  Watch for lows    HTN (hypertension), benign    Bp excellent  Hypercholesteremia    Continue statin      Electronically signed by:  Laina Conte MD 05/15/25 5:03 PM                   [1]   Patient Active Problem List  Diagnosis    Bilateral pulmonary embolism (Multi)    BPH (benign prostatic hyperplasia)    Cardiomyopathy, dilated (Multi)    Diabetic neuropathy (Multi)    Type 2 diabetes mellitus    Chronic GERD    Heparin-induced thrombocytopenia (Multi)    HTN (hypertension), benign    Hyperlipidemia    Hypomagnesemia    Chronic lumbar radiculopathy    Class 2 obesity with body mass index (BMI) of 36.0 to 36.9 in adult    RADHA (obstructive sleep apnea)    Unsteady gait    Carpal tunnel syndrome of left wrist    Mixed anxiety depressive disorder    Tachycardia    History of repair of atrial septal defect   [2]   Allergies  Allergen " Reactions    Heparin Other     Low platelet count

## 2025-06-04 ENCOUNTER — APPOINTMENT (OUTPATIENT)
Dept: PODIATRY | Facility: CLINIC | Age: 55
End: 2025-06-04
Payer: COMMERCIAL

## 2025-06-04 DIAGNOSIS — M62.81 MUSCLE WEAKNESS OF LOWER EXTREMITY: ICD-10-CM

## 2025-06-04 DIAGNOSIS — M79.672 PAIN IN BOTH FEET: Primary | ICD-10-CM

## 2025-06-04 DIAGNOSIS — L60.0 INGROWING NAIL: ICD-10-CM

## 2025-06-04 DIAGNOSIS — M79.671 PAIN IN BOTH FEET: Primary | ICD-10-CM

## 2025-06-04 DIAGNOSIS — M54.16 CHRONIC LUMBAR RADICULOPATHY: ICD-10-CM

## 2025-06-04 DIAGNOSIS — E11.42 DIABETIC POLYNEUROPATHY ASSOCIATED WITH TYPE 2 DIABETES MELLITUS: ICD-10-CM

## 2025-06-04 PROCEDURE — 99203 OFFICE O/P NEW LOW 30 MIN: CPT | Performed by: PODIATRIST

## 2025-06-04 PROCEDURE — 4010F ACE/ARB THERAPY RXD/TAKEN: CPT | Performed by: PODIATRIST

## 2025-06-04 PROCEDURE — 1036F TOBACCO NON-USER: CPT | Performed by: PODIATRIST

## 2025-06-04 RX ORDER — MUPIROCIN CALCIUM 20 MG/G
CREAM TOPICAL DAILY
Qty: 30 G | Refills: 2 | Status: SHIPPED | OUTPATIENT
Start: 2025-06-04 | End: 2025-06-14

## 2025-06-04 NOTE — PROGRESS NOTES
"Chief Complaint   Patient presents with    Foot Pain    Bilateral foot pain and heel pain DM    PCP Hussain Haley MD  Last visit 05/14/25    History Of Present Illness  Hua Saavedra is a 54 y.o. male presenting with chief complaint of bilateral foot issues.  He has gait and balance.  He states he has known neuropathy but has also seen a neurologist in the past.  He states he had EMG/NCV studies which showed something that may be \"more than neuropathy\".  He is unsure of his exact diagnosis.  He has not followed with neurology in some time as he states they did not do much for him.  He has seen Ortho surgery as well as pain management.  He has known lower lumbar issues and it has been told he needs another lower lumbar spine surgery.  He is unsure if he is having foot issues that are contributing to his back issues.  He presents today for evaluation and treatment options.  He finds it difficult to lift his feet.  Review of chart shows his last hemoglobin A1c was 5.8     Past Medical History  He has a past medical history of Anxiety, Arthritis (2013), Benign prostatic hyperplasia with lower urinary tract symptoms, symptom details unspecified, Bilateral pulmonary embolism (Multi) (03/15/2023), Cardiomyopathy, dilated (Multi), Depression, Diabetes mellitus (Multi), GERD (gastroesophageal reflux disease), Heparin-induced thrombocytopenia (Multi) (03/15/2023), History of repair of atrial septal defect (01/19/2024), HIT (heparin-induced thrombocytopenia) (Multi), HTN (hypertension), benign (03/15/2023), Hypercholesteremia, Hyperlipidemia (03/15/2023), Hyperlipidemia, unspecified hyperlipidemia type, Hypertension, Hypertriglyceridemia, Lumbar radicular pain, Obesity, RADHA (obstructive sleep apnea), Tachycardia (01/19/2024), and Type 2 diabetes mellitus (09/19/2013).    Surgical History  He has a past surgical history that includes ASD repair; Biceps Tenodesis; Embolectomy (04/16/2019); Cardiac surgery (1988); and Spine " surgery (2019).     Social History  He reports that he has never smoked. He has never been exposed to tobacco smoke. He has never used smokeless tobacco. He reports current alcohol use of about 3.0 standard drinks of alcohol per week. He reports that he does not currently use drugs.    Family History  Family History[1]     Allergies  Heparin    Medications  Current Medications[2]    Review of Systems    REVIEW OF SYSTEMS  GENERAL:  Negative for malaise, significant weight loss, fever      Objective:   Vasc: DP and PT pulses are palpable bilateral.  CFT is less than 3 seconds bilateral.  Skin temperature is warm to cool proximal to distal bilateral.      Neuro:  Light touch is diminished to the foot bilateral.  Protective sensation is diminished to the foot when tested with the 5.07 SWM bilateral.      Derm: There is no acute edema noted to bilateral feet.  The right hallux medial nail border shows incurvation and edema.  There is serous drainage noted but no purulence.  There is some localized erythema noted    Ortho: There is weakness in dorsiflexion muscle strength bilaterally.  Mainly muscle strength is 5 out of 5    Lower lumbar MRI report reviewed.  Patient is status post laminectomy and L2-L3 with degenerative changes to the lower lumbar spine which have progressed from 2020    Assessment/Plan     Diagnoses and all orders for this visit:  Pain in both feet  -     XR foot 3+ views bilateral; Future  Ingrowing nail  -     mupirocin (Bactroban) 2 % cream; Apply topically once daily for 10 days.  Chronic lumbar radiculopathy  Diabetic polyneuropathy associated with type 2 diabetes mellitus  Muscle weakness of lower extremity      Type 2 diabetes with polyneuropathy  Certainly some of his nerve symptoms may be secondary to diabetes.  Hemoglobin A1c is 5.8.  He is already on gabapentin and should continue to take this    2.  Lower lumbar radiculopathy with dorsiflexion weakness  Patient's lower lumbar spine MRI  "report was reviewed.  He does have known lower lumbar radiculopathy and follows with Ortho surgery.  He has been told he may need another back surgery.  He does understand the nerves that go to the feet do start in the lower lumbar spine.  The chart shows he is already seeing pain management for injections.  He should continue following up with them.  Due to the dorsiflexion weakness he is having difficulty navigating stairs.  He states he feels like \"he is walking on pegs\".  We did discuss the role of physical therapy.  He states he was already referred.  He is encouraged to make an appointment with them.  We did discuss the role of dorsiflexor assist AFOs.  If he would like to move forward this he will call the office and I am happy to write him a prescription    3.  Right hallux ingrown toenail  Patient has acute ingrowth of the right hallux medial nail border.  He is diabetic understands his infection could worsen because of this.  He states is a chronic in nature for him.  He states this \"comes and goes\".  He does not want a procedure today.  I have recommended Bactroban cream which was prescribed today.  He understands if this worsens at any time he is to call the office we can do a partial nail avulsion.      Korina Yeboah, CHRISTIE       [1]   Family History  Problem Relation Name Age of Onset    Hypertension Mother Nelda     Alcohol abuse Mother Nelda     Valvular heart disease Father Henri     Kidney disease Father Henri     No Known Problems Sister      No Known Problems Brother      No Known Problems Daughter      No Known Problems Son      No Known Problems Mother's Sister      No Known Problems Mother's Brother      No Known Problems Father's Sister      No Known Problems Father's Brother      No Known Problems Maternal Grandmother      No Known Problems Maternal Grandfather      No Known Problems Paternal Grandmother      No Known Problems Paternal Grandfather      No Known Problems Other     [2] "   Current Outpatient Medications   Medication Sig Dispense Refill    acetaminophen (Tylenol) 325 mg tablet Take 1-2 tablets (325-650 mg) by mouth every 4 hours if needed.      blood-glucose sensor (FreeStyle Leisa 2 Plus Sensor) device Change every 15 days 6 each 3    carvedilol (Coreg) 25 mg tablet Take 2 tablets (50 mg) by mouth 2 times a day. 360 tablet 3    celecoxib (CeleBREX) 200 mg capsule Take 1 capsule (200 mg) by mouth once daily. 90 capsule 0    gabapentin (Neurontin) 300 mg capsule TAKE 3 CAPSULES IN THE MORNING AND 3 CAPSULES BEFORE BEDTIME 540 capsule 3    insulin glargine (Lantus) 100 unit/mL (3 mL) pen Inject 44 Units under the skin 2 times a day. Take as directed per insulin instructions. 79.2 mL 3    lisinopril 40 mg tablet Take 1 tablet (40 mg) by mouth once daily. 90 tablet 3    metFORMIN (Glucophage) 1,000 mg tablet TAKE 1 TABLET TWICE A  tablet 3    pantoprazole (ProtoNix) 40 mg EC tablet TAKE 1 TABLET DAILY 90 tablet 3    rosuvastatin (Crestor) 20 mg tablet TAKE 1 TABLET DAILY 90 tablet 3    tamsulosin (Flomax) 0.4 mg 24 hr capsule TAKE 2 CAPSULES ONCE DAILY 90 capsule 7    tirzepatide 15 mg/0.5 mL pen injector Inject 15 mg under the skin every 7 days. 6 mL 3    venlafaxine XR (Effexor-XR) 150 mg 24 hr capsule Take 1 capsule (150 mg) by mouth once daily. With food 90 capsule 3    blood-glucose sensor (FREESTYLE LEISA 2 PLUS SENSOR MISC)       FreeStyle Leisa 2 Sensor kit Change every 14 days 6 each 3    mupirocin (Bactroban) 2 % cream Apply topically once daily for 10 days. 30 g 2     No current facility-administered medications for this visit.

## 2025-06-10 DIAGNOSIS — I42.0 CARDIOMYOPATHY, DILATED (MULTI): Chronic | ICD-10-CM

## 2025-06-10 RX ORDER — LISINOPRIL 40 MG/1
40 TABLET ORAL DAILY
Qty: 90 TABLET | Refills: 3 | Status: SHIPPED | OUTPATIENT
Start: 2025-06-10 | End: 2026-06-10

## 2025-06-19 DIAGNOSIS — K21.9 CHRONIC GERD: ICD-10-CM

## 2025-06-19 DIAGNOSIS — M54.16 LUMBAR RADICULAR PAIN: ICD-10-CM

## 2025-06-19 RX ORDER — PANTOPRAZOLE SODIUM 40 MG/1
40 TABLET, DELAYED RELEASE ORAL DAILY
Qty: 90 TABLET | Refills: 3 | Status: SHIPPED | OUTPATIENT
Start: 2025-06-19

## 2025-06-29 DIAGNOSIS — N40.1 BENIGN PROSTATIC HYPERPLASIA WITH LOWER URINARY TRACT SYMPTOMS, SYMPTOM DETAILS UNSPECIFIED: ICD-10-CM

## 2025-06-30 RX ORDER — TAMSULOSIN HYDROCHLORIDE 0.4 MG/1
0.8 CAPSULE ORAL DAILY
Qty: 90 CAPSULE | Refills: 6 | Status: SHIPPED | OUTPATIENT
Start: 2025-06-30

## 2025-08-04 DIAGNOSIS — Z79.4 TYPE 2 DIABETES MELLITUS WITHOUT COMPLICATION, WITH LONG-TERM CURRENT USE OF INSULIN: ICD-10-CM

## 2025-08-04 DIAGNOSIS — E11.9 TYPE 2 DIABETES MELLITUS WITHOUT COMPLICATION, WITH LONG-TERM CURRENT USE OF INSULIN: ICD-10-CM

## 2025-08-04 RX ORDER — METFORMIN HYDROCHLORIDE 1000 MG/1
1000 TABLET ORAL 2 TIMES DAILY
Qty: 180 TABLET | Refills: 3 | Status: SHIPPED | OUTPATIENT
Start: 2025-08-04

## 2025-08-06 ENCOUNTER — OFFICE VISIT (OUTPATIENT)
Dept: CARDIOLOGY | Facility: CLINIC | Age: 55
End: 2025-08-06
Payer: COMMERCIAL

## 2025-08-06 VITALS
HEIGHT: 77 IN | HEART RATE: 99 BPM | OXYGEN SATURATION: 94 % | WEIGHT: 311.8 LBS | SYSTOLIC BLOOD PRESSURE: 110 MMHG | BODY MASS INDEX: 36.82 KG/M2 | DIASTOLIC BLOOD PRESSURE: 72 MMHG

## 2025-08-06 DIAGNOSIS — R00.0 TACHYCARDIA: Primary | Chronic | ICD-10-CM

## 2025-08-06 DIAGNOSIS — I42.0 DILATED CARDIOMYOPATHY (MULTI): ICD-10-CM

## 2025-08-06 DIAGNOSIS — I10 HTN (HYPERTENSION), BENIGN: Chronic | ICD-10-CM

## 2025-08-06 PROCEDURE — 3074F SYST BP LT 130 MM HG: CPT | Performed by: PHYSICIAN ASSISTANT

## 2025-08-06 PROCEDURE — 99212 OFFICE O/P EST SF 10 MIN: CPT

## 2025-08-06 PROCEDURE — 3078F DIAST BP <80 MM HG: CPT | Performed by: PHYSICIAN ASSISTANT

## 2025-08-06 PROCEDURE — 3008F BODY MASS INDEX DOCD: CPT | Performed by: PHYSICIAN ASSISTANT

## 2025-08-06 PROCEDURE — 99213 OFFICE O/P EST LOW 20 MIN: CPT | Performed by: PHYSICIAN ASSISTANT

## 2025-08-06 PROCEDURE — 93010 ELECTROCARDIOGRAM REPORT: CPT | Performed by: INTERNAL MEDICINE

## 2025-08-06 PROCEDURE — 93005 ELECTROCARDIOGRAM TRACING: CPT | Performed by: PHYSICIAN ASSISTANT

## 2025-08-06 PROCEDURE — 4010F ACE/ARB THERAPY RXD/TAKEN: CPT | Performed by: PHYSICIAN ASSISTANT

## 2025-08-06 PROCEDURE — 1036F TOBACCO NON-USER: CPT | Performed by: PHYSICIAN ASSISTANT

## 2025-08-06 NOTE — PROGRESS NOTES
"Chief Complaint:   Follow-up (Yearly lisa pt ), HFmrEF     History Of Present Illness:    Hua Saavedra is a 55 y.o. male presenting with chronic HFmrEF, repeat 2D echo 11/2024 displaying stable LVSF, EF 45-50% - previously 40-45% on 2D echo from 2022.  Patient denies chest pain, chest pressure, palpitations, dyspnea on exertion, shortness of breath at rest, diaphoresis, nausea/vomiting, back pain, headache, lightheadedness, dizziness, syncope or presyncopal episodes, active bleeding signs or symptoms, excessive weight gain, muscle or joint pain, claudication.       Last Recorded Vitals:  Vitals:    08/06/25 0826   BP: 110/72   BP Location: Left arm   Patient Position: Sitting   BP Cuff Size: Adult   Pulse: 99   SpO2: 94%   Weight: 141 kg (311 lb 12.8 oz)   Height: (!) 1.956 m (6' 5\")       Past Medical History:  He has a past medical history of Anxiety, Arthritis (2013), Benign prostatic hyperplasia with lower urinary tract symptoms, symptom details unspecified, Bilateral pulmonary embolism (Multi) (03/15/2023), Cardiomyopathy, dilated (Multi), Deep vein thrombosis (Multi) (2019), Depression, Diabetes mellitus (Multi), GERD (gastroesophageal reflux disease), Heparin-induced thrombocytopenia (Multi) (03/15/2023), History of repair of atrial septal defect (01/19/2024), HIT (heparin-induced thrombocytopenia) (Multi), HTN (hypertension), benign (03/15/2023), Hypercholesteremia, Hyperlipidemia (03/15/2023), Hyperlipidemia, unspecified hyperlipidemia type, Hypertension, Hypertriglyceridemia, Lumbar radicular pain, Obesity, RADHA (obstructive sleep apnea), Pulmonary embolism, Tachycardia (01/19/2024), and Type 2 diabetes mellitus (09/19/2013).    Past Surgical History:  He has a past surgical history that includes ASD repair; Biceps Tenodesis; Embolectomy (04/16/2019); Cardiac surgery (1988); and Spine surgery (2019).      Social History:  He reports that he has never smoked. He has never been exposed to tobacco smoke. He " has never used smokeless tobacco. He reports current alcohol use of about 4.0 standard drinks of alcohol per week. He reports current drug use. Frequency: 3.00 times per week. Drug: Marijuana.    Family History:  Family History[1]     Allergies:  Heparin    Outpatient Medications:  Current Outpatient Medications   Medication Instructions    acetaminophen (Tylenol) 325 mg tablet 1-2 tablets, Every 4 hours PRN    blood-glucose sensor (FREESTYLE JUAREZ 2 PLUS SENSOR MISC) No dose, route, or frequency recorded.    blood-glucose sensor (FreeStyle Juarez 2 Plus Sensor) device Change every 15 days    carvedilol (COREG) 50 mg, oral, 2 times daily    celecoxib (CELEBREX) 200 mg, oral, Daily    FreeStyle Juarez 2 Sensor kit Change every 14 days    gabapentin (Neurontin) 300 mg capsule TAKE 3 CAPSULES IN THE MORNING AND 3 CAPSULES BEFORE BEDTIME    insulin glargine (LANTUS) 44 Units, subcutaneous, 2 times daily, Take as directed per insulin instructions.    lisinopril 40 mg, oral, Daily    metFORMIN (GLUCOPHAGE) 1,000 mg, oral, 2 times daily    pantoprazole (PROTONIX) 40 mg, oral, Daily    rosuvastatin (CRESTOR) 20 mg, oral, Daily    tamsulosin (FLOMAX) 0.8 mg, oral, Daily    tirzepatide 15 mg, subcutaneous, Every 7 days    venlafaxine XR (EFFEXOR-XR) 150 mg, oral, Daily, With food       Physical Exam:  Constitutional: awake and alert, oriented ×3, no apparent distress  Skin: warm, dry, good turgor no obvious lesions  Eyes: pupils equal, round, reactive to light, conjunctiva pink and noninjected, no discharge  HENT: normocephalic and atraumatic, mucous membranes moist, trachea midline with no masses/goiter  Cardiovascular: S1/S2 regular, no murmur no rubs/gallops, no carotid bruits, no JVD  Pulmonary: symmetrical chest expansion, lungs are clear to auscultation bilaterally, no wheezes/rales/rhonchi, normal effort  Abdomen: nontender, nondistended, active bowel sounds, no ascites  Extremities: no cyanosis, clubbing, no LE edema no  lesions; palpable pedal pulses  Neurologic: cranial nerves II - XII grossly intact, stable gait, no tremor       Last Labs:  CBC -  Lab Results   Component Value Date    WBC 6.1 05/10/2025    HGB 13.4 05/10/2025    HCT 40.5 05/10/2025    MCV 95.3 05/10/2025     05/10/2025       CMP -  Lab Results   Component Value Date    CALCIUM 9.1 05/10/2025    PHOS 4.4 04/28/2019    PROT 6.6 05/10/2025    ALBUMIN 4.5 05/10/2025    AST 22 05/10/2025    ALT 16 05/10/2025    ALKPHOS 39 05/10/2025    BILITOT 0.5 05/10/2025       LIPID PANEL -   Lab Results   Component Value Date    CHOL 123 05/10/2025    TRIG 209 (H) 05/10/2025    HDL 37 (L) 05/10/2025    CHHDL 3.3 05/10/2025    LDLF 61 06/25/2022    VLDL 29 09/30/2023    NHDL 86 05/10/2025       RENAL FUNCTION PANEL -   Lab Results   Component Value Date    GLUCOSE 132 (H) 05/10/2025     05/10/2025    K 5.1 05/10/2025     05/10/2025    CO2 23 05/10/2025    ANIONGAP 8 05/10/2025    BUN 26 (H) 05/10/2025    CREATININE 1.01 05/10/2025    GFRMALE >90 02/25/2023    CALCIUM 9.1 05/10/2025    PHOS 4.4 04/28/2019    ALBUMIN 4.5 05/10/2025        Lab Results   Component Value Date    BNP 9 05/14/2022    HGBA1C 5.8 (H) 05/10/2025       Last Cardiology Tests:  ECG:  ECG 12 Lead 01/25/2024      Echo:  Transthoracic Echo (TTE) Complete 11/12/2024      Ejection Fractions:  EF   Date/Time Value Ref Range Status   11/12/2024 09:00 AM 48 %        Cath:  No results found for this or any previous visit from the past 1095 days.      Stress Test:  No results found for this or any previous visit from the past 1095 days.      Cardiac Imaging:  No results found for this or any previous visit from the past 1095 days.      Assessment/Plan   Problem List Items Addressed This Visit           ICD-10-CM       Cardiac and Vasculature    HTN (hypertension), benign (Chronic) I10    Tachycardia - Primary (Chronic) R00.0    Dilated cardiomyopathy (Multi) I42.0    Relevant Orders    ECG 12 lead  (Clinic Performed)       -Stable, mildly reduced LVSF on repeat 2D echo    -Continue current GDMT as prescribed as patient appears comfortable without signs/symptoms of volume overload    -F/U with Dr. Antony in 1 year, sooner if issues arise in the interim      Javier Juárez PA-C         [1]   Family History  Problem Relation Name Age of Onset    Hypertension Mother Nleda     Alcohol abuse Mother Nelda     Valvular heart disease Father Henri     Kidney disease Father Henri     No Known Problems Sister      No Known Problems Brother      No Known Problems Daughter      No Known Problems Son      No Known Problems Mother's Sister      No Known Problems Mother's Brother      No Known Problems Father's Sister      No Known Problems Father's Brother      No Known Problems Maternal Grandmother      No Known Problems Maternal Grandfather      No Known Problems Paternal Grandmother      No Known Problems Paternal Grandfather      No Known Problems Other

## 2025-08-07 LAB
ATRIAL RATE: 99 BPM
P AXIS: 52 DEGREES
P OFFSET: 193 MS
P ONSET: 159 MS
PR INTERVAL: 136 MS
Q ONSET: 227 MS
QRS COUNT: 16 BEATS
QRS DURATION: 102 MS
QT INTERVAL: 352 MS
QTC CALCULATION(BAZETT): 451 MS
QTC FREDERICIA: 415 MS
R AXIS: 75 DEGREES
T AXIS: 17 DEGREES
T OFFSET: 403 MS
VENTRICULAR RATE: 99 BPM

## 2025-08-27 DIAGNOSIS — M48.062 SPINAL STENOSIS OF LUMBAR REGION WITH NEUROGENIC CLAUDICATION: ICD-10-CM

## 2025-08-27 DIAGNOSIS — E78.5 HYPERLIPIDEMIA, UNSPECIFIED HYPERLIPIDEMIA TYPE: ICD-10-CM

## 2025-08-27 DIAGNOSIS — Z79.4 TYPE 2 DIABETES MELLITUS WITHOUT COMPLICATION, WITH LONG-TERM CURRENT USE OF INSULIN: ICD-10-CM

## 2025-08-27 DIAGNOSIS — E11.9 TYPE 2 DIABETES MELLITUS WITHOUT COMPLICATION, WITH LONG-TERM CURRENT USE OF INSULIN: ICD-10-CM

## 2025-08-27 RX ORDER — CELECOXIB 200 MG/1
200 CAPSULE ORAL DAILY
Qty: 90 CAPSULE | Refills: 3 | Status: SHIPPED | OUTPATIENT
Start: 2025-08-27

## 2025-08-27 RX ORDER — ROSUVASTATIN CALCIUM 20 MG/1
20 TABLET, COATED ORAL DAILY
Qty: 90 TABLET | Refills: 3 | Status: SHIPPED | OUTPATIENT
Start: 2025-08-27

## 2025-08-27 RX ORDER — INSULIN GLARGINE 100 [IU]/ML
44 INJECTION, SOLUTION SUBCUTANEOUS 2 TIMES DAILY
Qty: 79.2 ML | Refills: 3 | Status: SHIPPED | OUTPATIENT
Start: 2025-08-27 | End: 2026-08-27

## 2025-09-05 DIAGNOSIS — M54.50 CHRONIC MIDLINE LOW BACK PAIN WITHOUT SCIATICA: Primary | ICD-10-CM

## 2025-09-05 DIAGNOSIS — G89.29 CHRONIC MIDLINE LOW BACK PAIN WITHOUT SCIATICA: Primary | ICD-10-CM
